# Patient Record
Sex: FEMALE | Race: WHITE | Employment: FULL TIME | URBAN - METROPOLITAN AREA
[De-identification: names, ages, dates, MRNs, and addresses within clinical notes are randomized per-mention and may not be internally consistent; named-entity substitution may affect disease eponyms.]

---

## 2018-06-05 ENCOUNTER — OFFICE VISIT (OUTPATIENT)
Dept: FAMILY MEDICINE CLINIC | Age: 58
End: 2018-06-05
Payer: COMMERCIAL

## 2018-06-05 VITALS
WEIGHT: 117 LBS | OXYGEN SATURATION: 97 % | TEMPERATURE: 97.9 F | HEIGHT: 60 IN | HEART RATE: 68 BPM | SYSTOLIC BLOOD PRESSURE: 112 MMHG | BODY MASS INDEX: 22.97 KG/M2 | DIASTOLIC BLOOD PRESSURE: 68 MMHG | RESPIRATION RATE: 16 BRPM

## 2018-06-05 DIAGNOSIS — G43.001 MIGRAINE WITHOUT AURA AND WITH STATUS MIGRAINOSUS, NOT INTRACTABLE: Primary | ICD-10-CM

## 2018-06-05 DIAGNOSIS — Z12.11 COLON CANCER SCREENING: ICD-10-CM

## 2018-06-05 DIAGNOSIS — Z00.00 HEALTH CARE MAINTENANCE: ICD-10-CM

## 2018-06-05 PROCEDURE — G8427 DOCREV CUR MEDS BY ELIG CLIN: HCPCS | Performed by: FAMILY MEDICINE

## 2018-06-05 PROCEDURE — 1036F TOBACCO NON-USER: CPT | Performed by: FAMILY MEDICINE

## 2018-06-05 PROCEDURE — 99396 PREV VISIT EST AGE 40-64: CPT | Performed by: FAMILY MEDICINE

## 2018-06-05 PROCEDURE — G8420 CALC BMI NORM PARAMETERS: HCPCS | Performed by: FAMILY MEDICINE

## 2018-06-05 PROCEDURE — 3017F COLORECTAL CA SCREEN DOC REV: CPT | Performed by: FAMILY MEDICINE

## 2018-06-05 RX ORDER — DICLOFENAC POTASSIUM 50 MG/1
50 TABLET, FILM COATED ORAL 3 TIMES DAILY PRN
Qty: 90 TABLET | Refills: 11 | Status: SHIPPED | OUTPATIENT
Start: 2018-06-05 | End: 2019-02-06 | Stop reason: DRUGHIGH

## 2018-06-05 RX ORDER — SUMATRIPTAN 100 MG/1
100 TABLET, FILM COATED ORAL PRN
Qty: 9 TABLET | Refills: 11 | Status: SHIPPED | OUTPATIENT
Start: 2018-06-05 | End: 2019-05-31 | Stop reason: SDUPTHER

## 2018-06-05 ASSESSMENT — ENCOUNTER SYMPTOMS
BACK PAIN: 0
SHORTNESS OF BREATH: 0
VOMITING: 0
CONSTIPATION: 1
SINUS PAIN: 1
HEARTBURN: 0
ORTHOPNEA: 0
COUGH: 0
DIARRHEA: 0
SORE THROAT: 0
BLOOD IN STOOL: 0
ABDOMINAL PAIN: 0
WHEEZING: 0
NAUSEA: 1
SPUTUM PRODUCTION: 0
DOUBLE VISION: 0
PHOTOPHOBIA: 1
BLURRED VISION: 0

## 2018-12-24 ENCOUNTER — OFFICE VISIT (OUTPATIENT)
Dept: FAMILY MEDICINE CLINIC | Age: 58
End: 2018-12-24
Payer: COMMERCIAL

## 2018-12-24 VITALS
SYSTOLIC BLOOD PRESSURE: 90 MMHG | RESPIRATION RATE: 16 BRPM | TEMPERATURE: 98.1 F | HEART RATE: 83 BPM | DIASTOLIC BLOOD PRESSURE: 60 MMHG | WEIGHT: 117 LBS | HEIGHT: 60 IN | BODY MASS INDEX: 22.97 KG/M2 | OXYGEN SATURATION: 97 %

## 2018-12-24 DIAGNOSIS — J02.9 ACUTE VIRAL PHARYNGITIS: ICD-10-CM

## 2018-12-24 DIAGNOSIS — J06.9 VIRAL URI: Primary | ICD-10-CM

## 2018-12-24 LAB — S PYO AG THROAT QL: NORMAL

## 2018-12-24 PROCEDURE — 3014F SCREEN MAMMO DOC REV: CPT | Performed by: NURSE PRACTITIONER

## 2018-12-24 PROCEDURE — 87880 STREP A ASSAY W/OPTIC: CPT | Performed by: NURSE PRACTITIONER

## 2018-12-24 PROCEDURE — 1036F TOBACCO NON-USER: CPT | Performed by: NURSE PRACTITIONER

## 2018-12-24 PROCEDURE — 3017F COLORECTAL CA SCREEN DOC REV: CPT | Performed by: NURSE PRACTITIONER

## 2018-12-24 PROCEDURE — 99213 OFFICE O/P EST LOW 20 MIN: CPT | Performed by: NURSE PRACTITIONER

## 2018-12-24 PROCEDURE — G8420 CALC BMI NORM PARAMETERS: HCPCS | Performed by: NURSE PRACTITIONER

## 2018-12-24 PROCEDURE — G8484 FLU IMMUNIZE NO ADMIN: HCPCS | Performed by: NURSE PRACTITIONER

## 2018-12-24 PROCEDURE — G8427 DOCREV CUR MEDS BY ELIG CLIN: HCPCS | Performed by: NURSE PRACTITIONER

## 2018-12-24 RX ORDER — FLUTICASONE PROPIONATE 50 MCG
2 SPRAY, SUSPENSION (ML) NASAL DAILY
Qty: 1 BOTTLE | Refills: 0 | Status: SHIPPED | OUTPATIENT
Start: 2018-12-24 | End: 2019-07-12 | Stop reason: SDUPTHER

## 2018-12-24 RX ORDER — AZITHROMYCIN 250 MG/1
250 TABLET, FILM COATED ORAL SEE ADMIN INSTRUCTIONS
Qty: 6 TABLET | Refills: 0 | Status: SHIPPED | OUTPATIENT
Start: 2018-12-24 | End: 2018-12-29

## 2018-12-24 RX ORDER — ACETAMINOPHEN 500 MG
1000 TABLET ORAL EVERY 6 HOURS PRN
Qty: 30 TABLET | Refills: 0 | Status: SHIPPED | OUTPATIENT
Start: 2018-12-24 | End: 2019-07-12

## 2018-12-24 ASSESSMENT — ENCOUNTER SYMPTOMS
WHEEZING: 0
RHINORRHEA: 1
BACK PAIN: 0
CONSTIPATION: 0
SHORTNESS OF BREATH: 0
COUGH: 0
VOMITING: 0
TROUBLE SWALLOWING: 1
NAUSEA: 0
DIARRHEA: 0
SORE THROAT: 1

## 2018-12-24 NOTE — PROGRESS NOTES
days 2 - 5  Dispense: 6 tablet; Refill: 0    2. Acute viral pharyngitis    - acetaminophen (TYLENOL) 500 MG tablet; Take 2 tablets by mouth every 6 hours as needed for Pain  Dispense: 30 tablet; Refill: 0      Return if symptoms worsen or fail to improve.         Staci Cerda, DU - CNP

## 2019-02-05 ENCOUNTER — TELEPHONE (OUTPATIENT)
Dept: FAMILY MEDICINE CLINIC | Age: 59
End: 2019-02-05

## 2019-02-06 DIAGNOSIS — G43.001 MIGRAINE WITHOUT AURA AND WITH STATUS MIGRAINOSUS, NOT INTRACTABLE: ICD-10-CM

## 2019-02-06 DIAGNOSIS — G43.801 OTHER MIGRAINE WITH STATUS MIGRAINOSUS, NOT INTRACTABLE: ICD-10-CM

## 2019-02-06 DIAGNOSIS — G43.711 CHRONIC MIGRAINE WITHOUT AURA, WITH INTRACTABLE MIGRAINE, SO STATED, WITH STATUS MIGRAINOSUS: Primary | ICD-10-CM

## 2019-05-31 ENCOUNTER — OFFICE VISIT (OUTPATIENT)
Dept: FAMILY MEDICINE CLINIC | Age: 59
End: 2019-05-31
Payer: COMMERCIAL

## 2019-05-31 ENCOUNTER — HOSPITAL ENCOUNTER (OUTPATIENT)
Age: 59
Discharge: HOME | End: 2019-06-02
Payer: COMMERCIAL

## 2019-05-31 VITALS
TEMPERATURE: 97.6 F | OXYGEN SATURATION: 98 % | WEIGHT: 116 LBS | BODY MASS INDEX: 22.78 KG/M2 | HEIGHT: 60 IN | HEART RATE: 76 BPM | DIASTOLIC BLOOD PRESSURE: 62 MMHG | SYSTOLIC BLOOD PRESSURE: 118 MMHG

## 2019-05-31 DIAGNOSIS — Z11.4 ENCOUNTER FOR SCREENING FOR HIV: ICD-10-CM

## 2019-05-31 DIAGNOSIS — Z12.11 COLON CANCER SCREENING: ICD-10-CM

## 2019-05-31 DIAGNOSIS — G43.001 MIGRAINE WITHOUT AURA AND WITH STATUS MIGRAINOSUS, NOT INTRACTABLE: Primary | ICD-10-CM

## 2019-05-31 DIAGNOSIS — Z11.59 NEED FOR HEPATITIS C SCREENING TEST: ICD-10-CM

## 2019-05-31 DIAGNOSIS — Z23 NEED FOR SHINGLES VACCINE: ICD-10-CM

## 2019-05-31 DIAGNOSIS — G43.711 CHRONIC MIGRAINE WITHOUT AURA, WITH INTRACTABLE MIGRAINE, SO STATED, WITH STATUS MIGRAINOSUS: ICD-10-CM

## 2019-05-31 DIAGNOSIS — Z00.00 HEALTH CARE MAINTENANCE: ICD-10-CM

## 2019-05-31 DIAGNOSIS — K59.04 CHRONIC IDIOPATHIC CONSTIPATION: ICD-10-CM

## 2019-05-31 DIAGNOSIS — Z23 NEED FOR TDAP VACCINATION: ICD-10-CM

## 2019-05-31 PROCEDURE — 99214 OFFICE O/P EST MOD 30 MIN: CPT | Performed by: FAMILY MEDICINE

## 2019-05-31 PROCEDURE — 3017F COLORECTAL CA SCREEN DOC REV: CPT | Performed by: FAMILY MEDICINE

## 2019-05-31 PROCEDURE — G8420 CALC BMI NORM PARAMETERS: HCPCS | Performed by: FAMILY MEDICINE

## 2019-05-31 PROCEDURE — 86803 HEPATITIS C AB TEST: CPT

## 2019-05-31 PROCEDURE — 36415 COLL VENOUS BLD VENIPUNCTURE: CPT | Performed by: FAMILY MEDICINE

## 2019-05-31 PROCEDURE — 1036F TOBACCO NON-USER: CPT | Performed by: FAMILY MEDICINE

## 2019-05-31 PROCEDURE — 90715 TDAP VACCINE 7 YRS/> IM: CPT | Performed by: FAMILY MEDICINE

## 2019-05-31 PROCEDURE — G8427 DOCREV CUR MEDS BY ELIG CLIN: HCPCS | Performed by: FAMILY MEDICINE

## 2019-05-31 PROCEDURE — 90471 IMMUNIZATION ADMIN: CPT | Performed by: FAMILY MEDICINE

## 2019-05-31 PROCEDURE — 86703 HIV-1/HIV-2 1 RESULT ANTBDY: CPT

## 2019-05-31 PROCEDURE — 3014F SCREEN MAMMO DOC REV: CPT | Performed by: FAMILY MEDICINE

## 2019-05-31 RX ORDER — DOCUSATE SODIUM 100 MG/1
100 CAPSULE, LIQUID FILLED ORAL DAILY PRN
Qty: 90 CAPSULE | Refills: 3
Start: 2019-05-31 | End: 2019-07-12 | Stop reason: SDUPTHER

## 2019-05-31 RX ORDER — SUMATRIPTAN 100 MG/1
100 TABLET, FILM COATED ORAL PRN
Qty: 9 TABLET | Refills: 11 | Status: SHIPPED | OUTPATIENT
Start: 2019-05-31 | End: 2019-07-12

## 2019-05-31 RX ORDER — POLYETHYLENE GLYCOL 3350 17 G/17G
POWDER, FOR SOLUTION ORAL
Qty: 1530 G | Refills: 1
Start: 2019-05-31

## 2019-05-31 ASSESSMENT — ENCOUNTER SYMPTOMS
BACK PAIN: 0
SINUS PAIN: 1
BLURRED VISION: 0
PHOTOPHOBIA: 1
NAUSEA: 1
BLOOD IN STOOL: 0
SHORTNESS OF BREATH: 0
ORTHOPNEA: 0
VOMITING: 0
SORE THROAT: 0
DOUBLE VISION: 0
DIARRHEA: 0
SPUTUM PRODUCTION: 0
CONSTIPATION: 1
COUGH: 0
WHEEZING: 0
HEARTBURN: 0
ABDOMINAL PAIN: 0

## 2019-05-31 ASSESSMENT — PATIENT HEALTH QUESTIONNAIRE - PHQ9
2. FEELING DOWN, DEPRESSED OR HOPELESS: 0
SUM OF ALL RESPONSES TO PHQ QUESTIONS 1-9: 0
SUM OF ALL RESPONSES TO PHQ9 QUESTIONS 1 & 2: 0
1. LITTLE INTEREST OR PLEASURE IN DOING THINGS: 0
DEPRESSION UNABLE TO ASSESS: FUNCTIONAL CAPACITY MOTIVATION LIMITS ACCURACY
SUM OF ALL RESPONSES TO PHQ QUESTIONS 1-9: 0

## 2019-05-31 NOTE — PROGRESS NOTES
Siena Shirley is a 63 y.o. female who presents today for     Chief Complaint   Patient presents with    Medication Refill     She is treated for migraine headaches    Migraines Headaches: Onset x several years (age 17). Not related to menstrual cycle (patient went through menopause at age 41). Headache pattern include pain behind right eye and right occipital area. Character is throbbing and stabbing. Multiple triggers; she has 3 herniated discs in cervical spine. Typically with insidious onset. Can last up to 3 days Exacerbated by smells, light, loud noise, overuse of neck, stress. Relieved by Treximet (but insurance does not cover adequately). Imitrex will abort headaches. Diclofenac Potassium decreases the frequency and severity of the headaches but not the duration. Previous trials of Topiramate initially effective but then became ineffective. Associated symptoms include nausea and right sided sinus congestion. Because of persistence of symptoms and lack of efficacy with previous treatments, patient inquired about new class of biologics to prevent migraines. After discussion, will try Emgality      Health Maintenance:  Siena Shirley is due for multiple aspects of HM screening and prevention. Due for colon cancer screening; she agrees to FOBT/FIT kit. Also due for Hep C and HIV screening, Tdap and Shingles vaccine. She is agreeable     [address]:  Patient's past medical, surgical, social and/or family history reviewed, updated in chart, and are non-contributory (unless otherwise stated). Medications and allergies also reviewed and updated in chart. Review of Systems  Review of Systems   Constitutional: Positive for malaise/fatigue. Negative for weight loss. HENT: Positive for congestion and sinus pain. Negative for ear pain and sore throat. Eyes: Positive for photophobia. Negative for blurred vision and double vision.    Respiratory: Negative for cough, sputum production, shortness of breath and wheezing. Cardiovascular: Negative for chest pain, palpitations, orthopnea and leg swelling. Gastrointestinal: Positive for constipation (has not used Miralax in some time and changed to OTC Stool softener with suboptimal results) and nausea. Negative for abdominal pain, blood in stool, diarrhea, heartburn and vomiting. Genitourinary: Negative for dysuria, frequency, hematuria and urgency. Musculoskeletal: Negative for back pain, joint pain, myalgias and neck pain. Skin: Negative for itching and rash. Neurological: Negative for dizziness, tingling, focal weakness, weakness and headaches. Psychiatric/Behavioral: Negative for depression, memory loss and substance abuse. The patient has insomnia. The patient is not nervous/anxious. Physical Exam:    VS:  /62   Pulse 76   Temp 97.6 °F (36.4 °C) (Oral)   Ht 5' (1.524 m)   Wt 116 lb (52.6 kg)   SpO2 98%   BMI 22.65 kg/m²   LAST WEIGHT:  Wt Readings from Last 3 Encounters:   05/31/19 116 lb (52.6 kg)   12/24/18 117 lb (53.1 kg)   06/05/18 117 lb (53.1 kg)     Physical Exam   Constitutional: She is oriented to person, place, and time. Vital signs are normal. She appears well-developed and well-nourished. No distress. HENT:   Head: Normocephalic and atraumatic. Right Ear: External ear normal.   Left Ear: External ear normal.   Mouth/Throat: Oropharynx is clear and moist. No oropharyngeal exudate. Eyes: Pupils are equal, round, and reactive to light. Conjunctivae and EOM are normal. Right eye exhibits no discharge. No scleral icterus. Neck: Normal range of motion. Neck supple. No thyromegaly present. Cardiovascular: Normal rate, regular rhythm, normal heart sounds and intact distal pulses. No murmur heard. Pulmonary/Chest: Effort normal. No stridor. No respiratory distress. She has no wheezes. She has no rales. She exhibits no tenderness. Abdominal: Soft.  Bowel sounds are normal. She exhibits no distension and no mass. There is no tenderness. There is no guarding. Musculoskeletal: Normal range of motion. She exhibits no edema or tenderness. Lymphadenopathy:     She has no cervical adenopathy. Neurological: She is alert and oriented to person, place, and time. Skin: Skin is warm and dry. No rash noted. She is not diaphoretic. No erythema. No pallor. Psychiatric: She has a normal mood and affect. Her behavior is normal. Thought content normal.   Vitals reviewed. Labs:  Lab Results   Component Value Date     06/29/2017    K 4.9 06/29/2017    CL 99 06/29/2017    CO2 25 06/29/2017    BUN 19 06/29/2017    CREATININE 0.7 06/29/2017    PROT 7.5 06/29/2017    LABALBU 4.4 06/29/2017    CALCIUM 10.2 06/29/2017    GFRAA >60 06/29/2017    LABGLOM >60 06/29/2017    GLUCOSE 87 06/29/2017    AST 23 06/29/2017    ALT 15 06/29/2017    ALKPHOS 59 06/29/2017    BILITOT 0.5 06/29/2017    TSH 1.780 06/29/2017    CHOL 221 06/29/2017    TRIG 79 06/29/2017    HDL 74 06/29/2017    LDLCALC 131 06/29/2017        Lab Results   Component Value Date    CHOL 221 (H) 06/29/2017     Lab Results   Component Value Date    TRIG 79 06/29/2017     Lab Results   Component Value Date    HDL 74 06/29/2017     Lab Results   Component Value Date    LDLCALC 131 (H) 06/29/2017       No results found for: LABA1C  Lab Results   Component Value Date    LDLCALC 131 (H) 06/29/2017    CREATININE 0.7 06/29/2017         Assessment / Plan:      Cyndi was seen today for medication refill. Diagnoses and all orders for this visit:    Migraine without aura and with status migrainosus, not intractable:  Patient inquired about monthly biologic therapy  -     SUMAtriptan (IMITREX) 100 MG tablet;  Take 1 tablet by mouth as needed for Migraine  -     Galcanezumab-gnlm (EMGALITY) 120 MG/ML SOAJ; Inject 1 mL into the skin every 30 days  -     Prior authorization for Emgality provided    Chronic migraine without aura, with intractable migraine, so stated, with status migrainosus:  Patient inquired about monthly biologic therapy  -     SUMAtriptan (IMITREX) 100 MG tablet; Take 1 tablet by mouth as needed for Migraine  -     Galcanezumab-gnlm (EMGALITY) 120 MG/ML SOAJ; Inject 1 mL into the skin every 30 days  -     Prior authorization for [facility] provided    Health care maintenance:  Agreed to the following  -     POCT Fecal Immunochemical Test (FIT); Future  -     Hepatitis C Antibody; Future  -     HIV Screen; Future  -     Zoster [facility]); Future  -     Tdap (age 6y and older) IM (Boostrix)    Colon cancer screening  -     POCT Fecal Immunochemical Test (FIT); Future    Chronic idiopathic constipation  -     polyethylene glycol (GLYCOLAX) powder; Use 17 g (1 dose) Q MWF, off other days PRN  -     docusate sodium (COLACE) 100 MG capsule; Take 1 capsule by mouth daily as needed for Constipation    Encounter for screening for HIV  -     HIV Screen; Future    Need for hepatitis C screening test  -     Hepatitis C Antibody; Future    Need for Tdap vaccination  -     Tdap (age 6y and older) IM (Boostrix)    Need for shingles vaccine  -     Zoster [facility]); Future       For Prior Authorization of Emgality:   Migraine headaches since age 17. Imitrex will abort headaches. Diclofenac Potassium decreases the frequency and severity of the headaches but not the duration. Topiramate ineffective. Relieved by Treximet (but insurance does not cover adequately). Call or go to ED immediately if symptoms worsen or persist.    Follow Up:  Return 1) F/U 4-6 weeks to assess efficacy of new migraine medication, for 2) F/U 11 months (5/20)  check up, medication refills. or sooner if necessary. Educational materials and/or home exercises printed for patient's review and were included in patient instructions on his/her After Visit Summary and given to patient at the end of visit.       Counseled regarding above diagnosis, including possible risks and complications,  especially if left uncontrolled. Counseled regarding the possible side effects, risks, benefits and alternatives to treatment; patient and/or guardian verbalizes understanding, agrees, feels comfortable with and wishes to proceed with above treatment plan. Advised patient to call with any new medication issues, and read all Rx info from pharmacy to assure aware of all possible risks and side effects of medication before taking. Reviewed age and gender appropriate health screening exams and vaccinations. Advised patient regarding importance of keeping up with recommended health maintenance and to schedule as soon as possible if overdue, as this is important in assessing for undiagnosed pathology, especially cancer, as well as protecting against potentially harmful/life threatening disease. Patient and/or guardian verbalizes understanding and agrees with above counseling, assessment and plan. All questions answered.     Chris Miller MD

## 2019-06-03 LAB
HEPATITIS C ANTIBODY INTERPRETATION: NORMAL
HIV-1 AND HIV-2 ANTIBODIES: NORMAL

## 2019-07-11 ASSESSMENT — ENCOUNTER SYMPTOMS
COUGH: 0
ORTHOPNEA: 0
SINUS PAIN: 1
SORE THROAT: 0
WHEEZING: 0
DIARRHEA: 0
CONSTIPATION: 1
PHOTOPHOBIA: 1
HEARTBURN: 0
ABDOMINAL PAIN: 0
BLOOD IN STOOL: 0
DOUBLE VISION: 0
SPUTUM PRODUCTION: 0
BLURRED VISION: 0
VOMITING: 0
BACK PAIN: 0
NAUSEA: 1
SHORTNESS OF BREATH: 0

## 2019-07-12 ENCOUNTER — OFFICE VISIT (OUTPATIENT)
Dept: FAMILY MEDICINE CLINIC | Age: 59
End: 2019-07-12
Payer: COMMERCIAL

## 2019-07-12 VITALS
WEIGHT: 121.12 LBS | HEART RATE: 79 BPM | HEIGHT: 60 IN | DIASTOLIC BLOOD PRESSURE: 80 MMHG | RESPIRATION RATE: 16 BRPM | BODY MASS INDEX: 23.78 KG/M2 | SYSTOLIC BLOOD PRESSURE: 120 MMHG | OXYGEN SATURATION: 99 % | TEMPERATURE: 98 F

## 2019-07-12 DIAGNOSIS — Z00.00 HEALTH CARE MAINTENANCE: ICD-10-CM

## 2019-07-12 DIAGNOSIS — J06.9 VIRAL URI: ICD-10-CM

## 2019-07-12 DIAGNOSIS — G43.711 CHRONIC MIGRAINE WITHOUT AURA, WITH INTRACTABLE MIGRAINE, SO STATED, WITH STATUS MIGRAINOSUS: Primary | ICD-10-CM

## 2019-07-12 DIAGNOSIS — G43.001 MIGRAINE WITHOUT AURA AND WITH STATUS MIGRAINOSUS, NOT INTRACTABLE: ICD-10-CM

## 2019-07-12 DIAGNOSIS — Z12.11 COLON CANCER SCREENING: ICD-10-CM

## 2019-07-12 DIAGNOSIS — G43.801 OTHER MIGRAINE WITH STATUS MIGRAINOSUS, NOT INTRACTABLE: ICD-10-CM

## 2019-07-12 DIAGNOSIS — K59.04 CHRONIC IDIOPATHIC CONSTIPATION: ICD-10-CM

## 2019-07-12 LAB
CONTROL: NORMAL
HEMOCCULT STL QL: NORMAL

## 2019-07-12 PROCEDURE — G8420 CALC BMI NORM PARAMETERS: HCPCS | Performed by: FAMILY MEDICINE

## 2019-07-12 PROCEDURE — 1036F TOBACCO NON-USER: CPT | Performed by: FAMILY MEDICINE

## 2019-07-12 PROCEDURE — G8427 DOCREV CUR MEDS BY ELIG CLIN: HCPCS | Performed by: FAMILY MEDICINE

## 2019-07-12 PROCEDURE — 82274 ASSAY TEST FOR BLOOD FECAL: CPT | Performed by: FAMILY MEDICINE

## 2019-07-12 PROCEDURE — 3017F COLORECTAL CA SCREEN DOC REV: CPT | Performed by: FAMILY MEDICINE

## 2019-07-12 PROCEDURE — 99213 OFFICE O/P EST LOW 20 MIN: CPT | Performed by: FAMILY MEDICINE

## 2019-07-12 PROCEDURE — 3014F SCREEN MAMMO DOC REV: CPT | Performed by: FAMILY MEDICINE

## 2019-07-12 RX ORDER — SUMATRIPTAN 100 MG/1
100 TABLET, FILM COATED ORAL PRN
Qty: 9 TABLET | Refills: 11
Start: 2019-07-12

## 2019-07-12 RX ORDER — DOCUSATE SODIUM 100 MG/1
100 CAPSULE, LIQUID FILLED ORAL DAILY PRN
Qty: 90 CAPSULE | Refills: 3 | Status: SHIPPED
Start: 2019-07-12 | End: 2022-06-07

## 2019-07-12 RX ORDER — FLUTICASONE PROPIONATE 50 MCG
2 SPRAY, SUSPENSION (ML) NASAL PRN
Qty: 1 BOTTLE | Refills: 11 | Status: SHIPPED | OUTPATIENT
Start: 2019-07-12

## 2019-07-12 NOTE — PATIENT INSTRUCTIONS
or 3 hours. Try a warm shower in place of one session with the heating pad. · You can also try an ice pack for 10 to 15 minutes every 2 to 3 hours. Put a thin cloth between the ice and your skin. · Take pain medicines exactly as directed. ? If the doctor gave you a prescription medicine for pain, take it as prescribed. ? If you are not taking a prescription pain medicine, ask your doctor if you can take an over-the-counter medicine. · If your doctor recommends a cervical collar, wear it exactly as directed. When should you call for help? Call your doctor now or seek immediate medical care if:    · You have new or worsening numbness in your arms, buttocks or legs.     · You have new or worsening weakness in your arms or legs. (This could make it hard to stand up.)     · You lose control of your bladder or bowels.    Watch closely for changes in your health, and be sure to contact your doctor if:    · Your neck pain is getting worse.     · You are not getting better after 1 week.     · You do not get better as expected. Where can you learn more? Go to [email or web address]://[email or web address]-locate.[email or web address]. org and sign in to your [employer] account. Enter [id number] in the [facility] box to learn more about \"Neck Pain: Care Instructions. \"     If you do not have an account, please click on the \"Sign Up Now\" link. Current as of: September 20, 2018  Content Version: 12.0  © 0899-1539 [employer]. Care instructions adapted under license by [facility] ([facility]). If you have questions about a medical condition or this instruction, always ask your healthcare professional. [place] [address] any warranty or liability for your use of this information. Patient Education        Neck: Exercises  Your Care Instructions  Here are some examples of typical rehabilitation exercises for your condition. Start each exercise slowly. Ease off the exercise if you start to have pain.   Your doctor or physical

## 2019-12-17 ENCOUNTER — OFFICE VISIT (OUTPATIENT)
Dept: FAMILY MEDICINE CLINIC | Age: 59
End: 2019-12-17
Payer: COMMERCIAL

## 2019-12-17 VITALS
OXYGEN SATURATION: 97 % | DIASTOLIC BLOOD PRESSURE: 64 MMHG | WEIGHT: 111 LBS | TEMPERATURE: 98.1 F | HEIGHT: 60 IN | BODY MASS INDEX: 21.79 KG/M2 | SYSTOLIC BLOOD PRESSURE: 112 MMHG | HEART RATE: 77 BPM

## 2019-12-17 DIAGNOSIS — J01.00 ACUTE NON-RECURRENT MAXILLARY SINUSITIS: Primary | ICD-10-CM

## 2019-12-17 PROCEDURE — 99213 OFFICE O/P EST LOW 20 MIN: CPT | Performed by: NURSE PRACTITIONER

## 2019-12-17 RX ORDER — DOXYCYCLINE HYCLATE 100 MG/1
100 CAPSULE ORAL 2 TIMES DAILY
Qty: 20 CAPSULE | Refills: 0 | Status: SHIPPED | OUTPATIENT
Start: 2019-12-17 | End: 2019-12-27

## 2020-01-18 ASSESSMENT — ENCOUNTER SYMPTOMS
ORTHOPNEA: 0
BACK PAIN: 0
BLOOD IN STOOL: 0
ABDOMINAL PAIN: 0
HEARTBURN: 0
NAUSEA: 1
SORE THROAT: 0
PHOTOPHOBIA: 1
SHORTNESS OF BREATH: 0
COUGH: 0
DIARRHEA: 0
SINUS PAIN: 1
SPUTUM PRODUCTION: 0
VOMITING: 0
CONSTIPATION: 1
BLURRED VISION: 0
WHEEZING: 0
DOUBLE VISION: 0

## 2020-01-18 NOTE — PROGRESS NOTES
Bakari Benson is a 63 y.o. female who presents today for     Chief Complaint   Patient presents with    Migraine     no headaches since she began the new medication     She is treated for migraine headaches    Migraines Headaches: Onset x several years (age 17). Not related to menstrual cycle (patient went through menopause at age 41). Headache pattern include pain behind right eye and right occipital area. Character is throbbing and stabbing. Multiple triggers; she has 3 herniated discs in cervical spine. Typically with insidious onset. Can last up to 3 days Exacerbated by smells, light, loud noise, overuse of neck, stress. Relieved by Treximet (but insurance does not cover adequately). Imitrex will abort headaches. Diclofenac Potassium decreases the frequency and severity of the headaches but not the duration. Previous trials of Topiramate initially effective but then became ineffective. Associated symptoms include nausea and right sided sinus congestion. Patient was provided samples of Emgality, which she did loading dose and has been migraine free since. After a great deal of effort, Emgality was finally approved by insurance and patient will  prescription today. She has not been migraine symptoms free in over 40 years. .. She tapered off diclofenac completely. Only migraine medication at this time remains Emgality. There are concerns that insurance will not cover it due to the fact that patient is not under the care of a \"headache specialist\". Patient inquired about taking a \"biologics\" holiday x 1 month to see if symptoms recur off Emgality. After discussion about pros and cons of D/C medication x 1 months, shared decision making concluded that a trial off Emgality is a reasonable plan for 1 month. Will follow closely.   She will also call insurance company to determine if coverage for the other available biologics is any better or different      [address]:  Patient's past medical, surgical, social and/or family history reviewed, updated in chart, and are non-contributory (unless otherwise stated). Medications and allergies also reviewed and updated in chart. Review of Systems  Review of Systems   Constitutional: Positive for malaise/fatigue. Negative for weight loss. HENT: Positive for congestion and sinus pain. Negative for ear pain and sore throat. Eyes: Positive for photophobia. Negative for blurred vision and double vision. Respiratory: Negative for cough, sputum production, shortness of breath and wheezing. Cardiovascular: Negative for chest pain, palpitations, orthopnea and leg swelling. Gastrointestinal: Positive for constipation (has not used Miralax in some time and changed to OTC Stool softener with suboptimal results) and nausea. Negative for abdominal pain, blood in stool, diarrhea, heartburn and vomiting. Genitourinary: Negative for dysuria, frequency, hematuria and urgency. Musculoskeletal: Negative for back pain, joint pain, myalgias and neck pain. Skin: Negative for itching and rash. Neurological: Negative for dizziness, tingling, focal weakness, weakness and headaches. Endo/Heme/Allergies: Positive for environmental allergies. Psychiatric/Behavioral: Negative for depression, memory loss and substance abuse. The patient has insomnia. The patient is not nervous/anxious. Physical Exam:    VS:  /60 (Site: Right Upper Arm, Position: Sitting, Cuff Size: Medium Adult)   Pulse 77   Temp 98.2 °F (36.8 °C) (Oral)   Resp 16   Ht 5' (1.524 m)   Wt 112 lb 8 oz (51 kg)   SpO2 98%   Breastfeeding No   BMI 21.97 kg/m²   LAST WEIGHT:  Wt Readings from Last 3 Encounters:   01/20/20 112 lb 8 oz (51 kg)   12/17/19 111 lb (50.3 kg)   07/12/19 121 lb 1.9 oz (54.9 kg)     Physical Exam   Constitutional: She is oriented to person, place, and time. Vital signs are normal. She appears well-developed and well-nourished. No distress. Value Date    LDLCALC 131 (H) 06/29/2017       No results found for: LABA1C  Lab Results   Component Value Date    LDLCALC 131 (H) 06/29/2017    CREATININE 0.7 06/29/2017         Assessment / Plan:      Cyndi was seen today for migraine. Diagnoses and all orders for this visit:    Migraine without aura and with status migrainosus, not intractable      Chronic migraine without aura, with intractable migraine, so stated, with status migrainosus: Symptoms seem to have resolved with initiation of Emgality but running into insuracne coverage issues        -     After discussion and shared decision making, will place next dose of Emgality on hold and observe response    Migraine without aura and with status migrainosus, not intractable: Symptoms seem to have resolved with initiation of Emgality but running into insuracne coverage issues        -     After discussion and shared decision making, will place next dose of Emgality on hold and observe response      Other migraine with status migrainosus, not intractable: Symptoms seem to have resolved with initiation of Emgality but running into insuracne coverage issues        -     After discussion and shared decision making, will place next dose of Emgality on hold and observe response     For Prior Authorization of Emgality:   Migraine headaches since age 17. Imitrex will abort headaches. Diclofenac Potassium decreases the frequency and severity of the headaches but not the duration. Topiramate ineffective. Relieved by Treximet (but insurance does not cover adequately). Call or go to ED immediately if symptoms worsen or persist.    Follow Up:  Return F/U 4-6 weeks (2nd week of 3/2020), for To assess response of discontinuing medication on symptoms. or sooner if necessary.       Educational materials and/or home exercises printed for patient's review and were included in patient instructions on his/her After Visit Summary and given to patient at the end of visit. Counseled regarding above diagnosis, including possible risks and complications,  especially if left uncontrolled. Counseled regarding the possible side effects, risks, benefits and alternatives to treatment; patient and/or guardian verbalizes understanding, agrees, feels comfortable with and wishes to proceed with above treatment plan. Advised patient to call with any new medication issues, and read all Rx info from pharmacy to assure aware of all possible risks and side effects of medication before taking. Reviewed age and gender appropriate health screening exams and vaccinations. Advised patient regarding importance of keeping up with recommended health maintenance and to schedule as soon as possible if overdue, as this is important in assessing for undiagnosed pathology, especially cancer, as well as protecting against potentially harmful/life threatening disease. Patient and/or guardian verbalizes understanding and agrees with above counseling, assessment and plan. All questions answered.     Holly Nichols MD

## 2020-01-20 ENCOUNTER — OFFICE VISIT (OUTPATIENT)
Dept: FAMILY MEDICINE CLINIC | Age: 60
End: 2020-01-20
Payer: COMMERCIAL

## 2020-01-20 VITALS
HEART RATE: 77 BPM | DIASTOLIC BLOOD PRESSURE: 60 MMHG | HEIGHT: 60 IN | SYSTOLIC BLOOD PRESSURE: 100 MMHG | WEIGHT: 112.5 LBS | BODY MASS INDEX: 22.09 KG/M2 | OXYGEN SATURATION: 98 % | TEMPERATURE: 98.2 F | RESPIRATION RATE: 16 BRPM

## 2020-01-20 PROCEDURE — 1036F TOBACCO NON-USER: CPT | Performed by: FAMILY MEDICINE

## 2020-01-20 PROCEDURE — G8420 CALC BMI NORM PARAMETERS: HCPCS | Performed by: FAMILY MEDICINE

## 2020-01-20 PROCEDURE — G8427 DOCREV CUR MEDS BY ELIG CLIN: HCPCS | Performed by: FAMILY MEDICINE

## 2020-01-20 PROCEDURE — 99213 OFFICE O/P EST LOW 20 MIN: CPT | Performed by: FAMILY MEDICINE

## 2020-01-20 PROCEDURE — 3017F COLORECTAL CA SCREEN DOC REV: CPT | Performed by: FAMILY MEDICINE

## 2020-01-20 PROCEDURE — G8484 FLU IMMUNIZE NO ADMIN: HCPCS | Performed by: FAMILY MEDICINE

## 2020-01-20 RX ORDER — BACITRACIN 500 [USP'U]/G
OINTMENT OPHTHALMIC
COMMUNITY
Start: 2020-01-06 | End: 2022-06-07

## 2020-01-20 ASSESSMENT — PATIENT HEALTH QUESTIONNAIRE - PHQ9
1. LITTLE INTEREST OR PLEASURE IN DOING THINGS: 0
SUM OF ALL RESPONSES TO PHQ QUESTIONS 1-9: 0
SUM OF ALL RESPONSES TO PHQ QUESTIONS 1-9: 0
2. FEELING DOWN, DEPRESSED OR HOPELESS: 0
SUM OF ALL RESPONSES TO PHQ9 QUESTIONS 1 & 2: 0

## 2021-01-27 ASSESSMENT — ENCOUNTER SYMPTOMS
COUGH: 0
NAUSEA: 1
WHEEZING: 0
ORTHOPNEA: 0
BLOOD IN STOOL: 0
BLURRED VISION: 0
HEARTBURN: 0
SPUTUM PRODUCTION: 0
SHORTNESS OF BREATH: 0
BACK PAIN: 0
DIARRHEA: 0
SINUS PAIN: 1
SORE THROAT: 0
ABDOMINAL PAIN: 0
CONSTIPATION: 1
PHOTOPHOBIA: 1
VOMITING: 0
DOUBLE VISION: 0

## 2021-01-27 NOTE — PROGRESS NOTES
Jose Landau is a 62 y.o. female who presents today for     Chief Complaint   Patient presents with    Migraine     x 3 months, increasing over time     She is treated for migraine headaches    Migraines Headaches: Onset x several years (age 17). Not related to menstrual cycle (patient went through menopause at age 41). Headache pattern include pain behind right eye and right occipital area. Character is throbbing and stabbing. Multiple triggers; she has 3 herniated discs in cervical spine. Typically with insidious onset. Can last up to 3 days Exacerbated by smells, light, loud noise, overuse of neck, stress. Relieved by Treximet (but insurance does not cover adequately). Imitrex will abort headaches. Diclofenac Potassium decreases the frequency and severity of the headaches but not the duration. Previous trials of Topiramate initially effective but then became ineffective. Associated symptoms include nausea and right sided sinus congestion. Initiation of Emgality in 2019 produced resolution of her headaches that had been present for > 40 years. She returns today with recurrence of headaches. Further history: she had to space out the usage of her Emgality because of cost.  As a result, she is starting to become increasingly symptomatic from a frequency and severity standpoint        [address]:  Patient's past medical, surgical, social and/or family history reviewed, updated in chart, and are non-contributory (unless otherwise stated). Medications and allergies also reviewed and updated in chart. Review of Systems  Review of Systems   Constitutional: Positive for malaise/fatigue. Negative for weight loss. HENT: Positive for congestion and sinus pain. Negative for ear pain and sore throat. Eyes: Positive for photophobia. Negative for blurred vision and double vision. Respiratory: Negative for cough, sputum production, shortness of breath and wheezing.     Cardiovascular: Negative for chest pain, palpitations, orthopnea and leg swelling. Gastrointestinal: Positive for constipation (has not used Miralax in some time and changed to OTC Stool softener with suboptimal results) and nausea. Negative for abdominal pain, blood in stool, diarrhea, heartburn and vomiting. Genitourinary: Negative for dysuria, frequency, hematuria and urgency. Musculoskeletal: Negative for back pain, joint pain, myalgias and neck pain. Skin: Negative for itching and rash. Neurological: Positive for headaches. Negative for dizziness, tingling, focal weakness and weakness. Endo/Heme/Allergies: Positive for environmental allergies. Psychiatric/Behavioral: Negative for depression, memory loss and substance abuse. The patient has insomnia. The patient is not nervous/anxious. Physical Exam:    VS:  /60   Pulse 73   Temp 97.5 °F (36.4 °C) (Infrared)   Resp 18   Ht 5' (1.524 m)   Wt 116 lb 8 oz (52.8 kg)   SpO2 98%   BMI 22.75 kg/m²   LAST WEIGHT:  Wt Readings from Last 3 Encounters:   02/10/21 116 lb 8 oz (52.8 kg)   01/20/20 112 lb 8 oz (51 kg)   12/17/19 111 lb (50.3 kg)     Physical Exam   Constitutional: She is oriented to person, place, and time. Vital signs are normal. She appears well-developed and well-nourished. No distress. HENT:   Head: Normocephalic and atraumatic. Right Ear: External ear normal.   Left Ear: External ear normal.   Mouth/Throat: Oropharynx is clear and moist. No oropharyngeal exudate. Eyes: Pupils are equal, round, and reactive to light. Conjunctivae and EOM are normal. Right eye exhibits no discharge. No scleral icterus. Neck: Normal range of motion. Neck supple. No thyromegaly present. Cardiovascular: Normal rate, regular rhythm, normal heart sounds and intact distal pulses. No murmur heard. Pulmonary/Chest: Effort normal. No stridor. No respiratory distress. She has no wheezes. She has no rales. She exhibits no tenderness. Abdominal: Soft. Bowel sounds are normal. She exhibits no distension and no mass. There is no abdominal tenderness. There is no guarding. Musculoskeletal: Normal range of motion. General: No tenderness or edema. Lymphadenopathy:     She has no cervical adenopathy. Neurological: She is alert and oriented to person, place, and time. Skin: Skin is warm and dry. No rash noted. She is not diaphoretic. No erythema. No pallor. Psychiatric: She has a normal mood and affect. Her behavior is normal. Thought content normal.   Vitals reviewed. Labs:  Lab Results   Component Value Date     06/29/2017    K 4.9 06/29/2017    CL 99 06/29/2017    CO2 25 06/29/2017    BUN 19 06/29/2017    CREATININE 0.7 06/29/2017    PROT 7.5 06/29/2017    LABALBU 4.4 06/29/2017    CALCIUM 10.2 06/29/2017    GFRAA >60 06/29/2017    LABGLOM >60 06/29/2017    GLUCOSE 87 06/29/2017    AST 23 06/29/2017    ALT 15 06/29/2017    ALKPHOS 59 06/29/2017    BILITOT 0.5 06/29/2017    TSH 1.780 06/29/2017    CHOL 221 06/29/2017    TRIG 79 06/29/2017    HDL 74 06/29/2017    LDLCALC 131 06/29/2017        Lab Results   Component Value Date    CHOL 221 (H) 06/29/2017     Lab Results   Component Value Date    TRIG 79 06/29/2017     Lab Results   Component Value Date    HDL 74 06/29/2017     Lab Results   Component Value Date    LDLCALC 131 (H) 06/29/2017       No results found for: LABA1C  Lab Results   Component Value Date    LDLCALC 131 (H) 06/29/2017    CREATININE 0.7 06/29/2017         Assessment / Plan:      Cyndi was seen today for migraine. Diagnoses and all orders for this visit:    Migraine without aura and with status migrainosus, not intractable: Increasing frequency and severity with inconsistent use Emgality  -     Galcanezumab-gnlm (EMGALITY) 120 MG/ML SOAJ; Inject 1 mL into the skin every 30 days  -     Prior authorization to be attempted.  Patient will reach out to  as well as attempt use of               discount coupons to afford medications    Chronic migraine without aura, with intractable migraine, so stated, with status migrainosus: Increasing frequency and severity with inconsistent use Emgality  -     Galcanezumab-gnlm (EMGALITY) 120 MG/ML SOAJ; Inject 1 mL into the skin every 30 days  -     Prior authorization to be attempted. Patient will reach out to  as well as attempt use of               discount coupons to afford medications      Colon cancer screening: Agreed to [facility]; Future    Flu vaccine need  -     INFLUENZA, QUADV, 3 YRS AND OLDER, IM PF, PREFILL SYR OR SDV, 0.5ML (AFLURIA QUADV, PF)       For Prior Authorization of Emgality:   Migraine headaches since age 17. Imitrex will abort headaches. Diclofenac Potassium decreases the frequency and severity of the headaches but not the duration. Topiramate ineffective. Relieved by Treximet (but insurance does not cover adequately). Call or go to ED immediately if symptoms worsen or persist.    Follow Up:  Return for F/U 6 months for check up. or sooner if necessary. Educational materials and/or home exercises printed for patient's review and were included in patient instructions on his/her After Visit Summary and given to patient at the end of visit. Counseled regarding above diagnosis, including possible risks and complications,  especially if left uncontrolled. Counseled regarding the possible side effects, risks, benefits and alternatives to treatment; patient and/or guardian verbalizes understanding, agrees, feels comfortable with and wishes to proceed with above treatment plan. Advised patient to call with any new medication issues, and read all Rx info from pharmacy to assure aware of all possible risks and side effects of medication before taking. Reviewed age and gender appropriate health screening exams and vaccinations.   Advised patient regarding importance of keeping up with recommended health maintenance and to schedule as soon as possible if overdue, as this is important in assessing for undiagnosed pathology, especially cancer, as well as protecting against potentially harmful/life threatening disease. Patient and/or guardian verbalizes understanding and agrees with above counseling, assessment and plan. All questions answered.     Janna Barksdale MD

## 2021-02-10 ENCOUNTER — OFFICE VISIT (OUTPATIENT)
Dept: FAMILY MEDICINE CLINIC | Age: 61
End: 2021-02-10
Payer: COMMERCIAL

## 2021-02-10 VITALS
HEART RATE: 73 BPM | BODY MASS INDEX: 22.87 KG/M2 | DIASTOLIC BLOOD PRESSURE: 60 MMHG | TEMPERATURE: 97.5 F | WEIGHT: 116.5 LBS | HEIGHT: 60 IN | SYSTOLIC BLOOD PRESSURE: 108 MMHG | OXYGEN SATURATION: 98 % | RESPIRATION RATE: 18 BRPM

## 2021-02-10 DIAGNOSIS — Z23 FLU VACCINE NEED: ICD-10-CM

## 2021-02-10 DIAGNOSIS — G43.711 CHRONIC MIGRAINE WITHOUT AURA, WITH INTRACTABLE MIGRAINE, SO STATED, WITH STATUS MIGRAINOSUS: ICD-10-CM

## 2021-02-10 DIAGNOSIS — G43.001 MIGRAINE WITHOUT AURA AND WITH STATUS MIGRAINOSUS, NOT INTRACTABLE: Primary | ICD-10-CM

## 2021-02-10 DIAGNOSIS — Z12.11 COLON CANCER SCREENING: ICD-10-CM

## 2021-02-10 PROCEDURE — 3017F COLORECTAL CA SCREEN DOC REV: CPT | Performed by: FAMILY MEDICINE

## 2021-02-10 PROCEDURE — G8482 FLU IMMUNIZE ORDER/ADMIN: HCPCS | Performed by: FAMILY MEDICINE

## 2021-02-10 PROCEDURE — G8427 DOCREV CUR MEDS BY ELIG CLIN: HCPCS | Performed by: FAMILY MEDICINE

## 2021-02-10 PROCEDURE — 90686 IIV4 VACC NO PRSV 0.5 ML IM: CPT | Performed by: FAMILY MEDICINE

## 2021-02-10 PROCEDURE — 1036F TOBACCO NON-USER: CPT | Performed by: FAMILY MEDICINE

## 2021-02-10 PROCEDURE — 99213 OFFICE O/P EST LOW 20 MIN: CPT | Performed by: FAMILY MEDICINE

## 2021-02-10 PROCEDURE — 90471 IMMUNIZATION ADMIN: CPT | Performed by: FAMILY MEDICINE

## 2021-02-10 PROCEDURE — G8420 CALC BMI NORM PARAMETERS: HCPCS | Performed by: FAMILY MEDICINE

## 2021-02-10 RX ORDER — GALCANEZUMAB 120 MG/ML
1 INJECTION, SOLUTION SUBCUTANEOUS
Qty: 1 ML | Refills: 11 | Status: SHIPPED | OUTPATIENT
Start: 2021-02-10

## 2021-02-10 SDOH — ECONOMIC STABILITY: FOOD INSECURITY: WITHIN THE PAST 12 MONTHS, THE FOOD YOU BOUGHT JUST DIDN'T LAST AND YOU DIDN'T HAVE MONEY TO GET MORE.: NEVER TRUE

## 2021-02-10 SDOH — ECONOMIC STABILITY: TRANSPORTATION INSECURITY
IN THE PAST 12 MONTHS, HAS LACK OF TRANSPORTATION KEPT YOU FROM MEETINGS, WORK, OR FROM GETTING THINGS NEEDED FOR DAILY LIVING?: NOT ASKED

## 2021-02-10 SDOH — ECONOMIC STABILITY: FOOD INSECURITY: WITHIN THE PAST 12 MONTHS, YOU WORRIED THAT YOUR FOOD WOULD RUN OUT BEFORE YOU GOT MONEY TO BUY MORE.: NEVER TRUE

## 2021-02-10 ASSESSMENT — PATIENT HEALTH QUESTIONNAIRE - PHQ9
SUM OF ALL RESPONSES TO PHQ QUESTIONS 1-9: 0
SUM OF ALL RESPONSES TO PHQ QUESTIONS 1-9: 0

## 2021-02-10 NOTE — PROGRESS NOTES
Vaccine Information Sheet, \"Influenza - Inactivated\"  given to [facility] [employer], or parent/legal guardian of  [facility] [employer] and verbalized understanding. Patient responses:    Have you ever had a reaction to a flu vaccine? No  Do you have any current illness? No  Have you ever had Guillian Molena Syndrome? No  Do you have a serious allergy to any of the follow: Neomycin, Polymyxin, Thimerosal, eggs or egg products? No    Flu vaccine given per order. Please see immunization tab. Risks and benefits explained. Current VIS given.       Immunizations Administered     Name Date Dose Route    Influenza, Quadv, IM, PF (6 mo and older Fluzone, Flulaval, Fluarix, and 3 yrs and older Afluria) 2/10/2021 0.5 mL Intramuscular    Site: Deltoid- Left    Lot: [id number]    NDC: 04401-849-77

## 2021-03-04 DIAGNOSIS — Z12.11 COLON CANCER SCREENING: ICD-10-CM

## 2021-12-20 LAB — MAMMOGRAPHY, EXTERNAL: NORMAL

## 2022-01-09 LAB
Lab: NORMAL
SARS-COV-2, IGG: NORMAL

## 2022-06-07 ENCOUNTER — OFFICE VISIT (OUTPATIENT)
Dept: FAMILY MEDICINE CLINIC | Age: 62
End: 2022-06-07
Payer: COMMERCIAL

## 2022-06-07 VITALS
BODY MASS INDEX: 22.78 KG/M2 | SYSTOLIC BLOOD PRESSURE: 90 MMHG | OXYGEN SATURATION: 97 % | HEIGHT: 60 IN | DIASTOLIC BLOOD PRESSURE: 60 MMHG | HEART RATE: 70 BPM | RESPIRATION RATE: 16 BRPM | WEIGHT: 116 LBS | TEMPERATURE: 98.7 F

## 2022-06-07 DIAGNOSIS — F51.5 NIGHTMARE DISORDER: ICD-10-CM

## 2022-06-07 DIAGNOSIS — G47.69 SLEEP-RELATED MOVEMENT DISORDER: ICD-10-CM

## 2022-06-07 DIAGNOSIS — G47.69 SLEEP-RELATED MOVEMENT DISORDER: Primary | ICD-10-CM

## 2022-06-07 DIAGNOSIS — Z83.438 FH: HYPERLIPIDEMIA: ICD-10-CM

## 2022-06-07 LAB
BASOPHILS ABSOLUTE: 0.08 E9/L (ref 0–0.2)
BASOPHILS RELATIVE PERCENT: 0.8 % (ref 0–2)
EOSINOPHILS ABSOLUTE: 0.29 E9/L (ref 0.05–0.5)
EOSINOPHILS RELATIVE PERCENT: 2.9 % (ref 0–6)
HCT VFR BLD CALC: 45 % (ref 34–48)
HEMOGLOBIN: 14.6 G/DL (ref 11.5–15.5)
IMMATURE GRANULOCYTES #: 0.04 E9/L
IMMATURE GRANULOCYTES %: 0.4 % (ref 0–5)
LYMPHOCYTES ABSOLUTE: 2.14 E9/L (ref 1.5–4)
LYMPHOCYTES RELATIVE PERCENT: 21.4 % (ref 20–42)
MCH RBC QN AUTO: 30.7 PG (ref 26–35)
MCHC RBC AUTO-ENTMCNC: 32.4 % (ref 32–34.5)
MCV RBC AUTO: 94.7 FL (ref 80–99.9)
MONOCYTES ABSOLUTE: 0.68 E9/L (ref 0.1–0.95)
MONOCYTES RELATIVE PERCENT: 6.8 % (ref 2–12)
NEUTROPHILS ABSOLUTE: 6.75 E9/L (ref 1.8–7.3)
NEUTROPHILS RELATIVE PERCENT: 67.7 % (ref 43–80)
PDW BLD-RTO: 12.4 FL (ref 11.5–15)
PLATELET # BLD: 258 E9/L (ref 130–450)
PMV BLD AUTO: 10.1 FL (ref 7–12)
RBC # BLD: 4.75 E12/L (ref 3.5–5.5)
WBC # BLD: 10 E9/L (ref 4.5–11.5)

## 2022-06-07 PROCEDURE — 99214 OFFICE O/P EST MOD 30 MIN: CPT | Performed by: FAMILY MEDICINE

## 2022-06-07 RX ORDER — HYDROXYZINE HYDROCHLORIDE 10 MG/1
10 TABLET, FILM COATED ORAL NIGHTLY
Qty: 90 TABLET | Refills: 1 | Status: SHIPPED | OUTPATIENT
Start: 2022-06-07 | End: 2022-12-17

## 2022-06-07 RX ORDER — HYDROXYZINE HYDROCHLORIDE 10 MG/1
10 TABLET, FILM COATED ORAL NIGHTLY
Qty: 10 TABLET | Refills: 0 | Status: SHIPPED
Start: 2022-06-07 | End: 2022-06-07

## 2022-06-07 RX ORDER — SODIUM FLUORIDE 5 MG/G
CREAM DENTAL
COMMUNITY
Start: 2022-05-02

## 2022-06-07 SDOH — ECONOMIC STABILITY: FOOD INSECURITY: WITHIN THE PAST 12 MONTHS, THE FOOD YOU BOUGHT JUST DIDN'T LAST AND YOU DIDN'T HAVE MONEY TO GET MORE.: NEVER TRUE

## 2022-06-07 SDOH — ECONOMIC STABILITY: FOOD INSECURITY: WITHIN THE PAST 12 MONTHS, YOU WORRIED THAT YOUR FOOD WOULD RUN OUT BEFORE YOU GOT MONEY TO BUY MORE.: NEVER TRUE

## 2022-06-07 ASSESSMENT — PATIENT HEALTH QUESTIONNAIRE - PHQ9
2. FEELING DOWN, DEPRESSED OR HOPELESS: 0
SUM OF ALL RESPONSES TO PHQ QUESTIONS 1-9: 0
SUM OF ALL RESPONSES TO PHQ9 QUESTIONS 1 & 2: 0
1. LITTLE INTEREST OR PLEASURE IN DOING THINGS: 0
SUM OF ALL RESPONSES TO PHQ QUESTIONS 1-9: 0

## 2022-06-07 ASSESSMENT — ENCOUNTER SYMPTOMS
ABDOMINAL PAIN: 0
VOMITING: 0
SORE THROAT: 0
CONSTIPATION: 0
NAUSEA: 0
DIARRHEA: 0
BACK PAIN: 0
SHORTNESS OF BREATH: 0
COUGH: 0
BLOOD IN STOOL: 0
WHEEZING: 0

## 2022-06-07 ASSESSMENT — LIFESTYLE VARIABLES
HOW MANY STANDARD DRINKS CONTAINING ALCOHOL DO YOU HAVE ON A TYPICAL DAY: 1 OR 2
HOW OFTEN DO YOU HAVE A DRINK CONTAINING ALCOHOL: MONTHLY OR LESS

## 2022-06-07 ASSESSMENT — SOCIAL DETERMINANTS OF HEALTH (SDOH): HOW HARD IS IT FOR YOU TO PAY FOR THE VERY BASICS LIKE FOOD, HOUSING, MEDICAL CARE, AND HEATING?: NOT VERY HARD

## 2022-06-07 NOTE — PROGRESS NOTES
Kath Fuller is a 65 y.o. female who presents today for     Chief Complaint   Patient presents with    Insomnia     patient having nightmares, getting worse, somebody chasing patient or after patient. sometimes does not wake up, jumped out of bed, got injured on treadmill and did not wake up until  picked her up and put back in bed , started around 2 years ago and getting worse as time goes on. Nightmares:  Patient having nightmares. Onset x 2 years ago, with stressors of family illness, COVID, loss of job occurring. These are getting worse; she describes nightmares as somebody chasing patient. Despite fearful nature of nightmares, she sometimes will not wake up. However, she has jumped out of bed and acted out. One episode even led to injury on treadmill when she fell out of bed and did not wake up until  picked her up and put back in bed. STOP-BAN/8 (low risk). [place] Sleepiness Scale:  (moderate risk of MARIA EUGENIA)      [facility]:  Patient's past medical, surgical, social and/or family history reviewed, updated in chart, and are non-contributory (unless otherwise stated). Medications and allergies also reviewed and updated in chart. Review of Systems  Review of Systems   HENT: Negative for congestion, ear pain and sore throat. Respiratory: Negative for cough, shortness of breath and wheezing. Cardiovascular: Negative for chest pain, palpitations and leg swelling. Gastrointestinal: Negative for abdominal pain, blood in stool, constipation, diarrhea, nausea and vomiting. Genitourinary: Negative for dysuria, frequency, hematuria and urgency. Musculoskeletal: Negative for back pain, myalgias and neck pain. Skin: Negative for rash. Neurological: Positive for headaches (life long history migraine headaches). Negative for dizziness and weakness. Psychiatric/Behavioral: Positive for sleep disturbance. The patient is not nervous/anxious.         Physical Exam:    VS:  BP 90/60   Pulse 70   Temp 98.7 °F (37.1 °C) (Infrared)   Resp 16   Ht 5' (1.524 m)   Wt 116 lb (52.6 kg)   SpO2 97%   BMI 22.65 kg/m²     LAST WEIGHT:  Wt Readings from Last 3 Encounters:   06/07/22 116 lb (52.6 kg)   02/10/21 116 lb 8 oz (52.8 kg)   01/20/20 112 lb 8 oz (51 kg)       BMI Readings from Last 3 Encounters:   06/07/22 22.65 kg/m²   02/10/21 22.75 kg/m²   01/20/20 21.97 kg/m²       Physical Exam  Constitutional:       General: She is not in acute distress. Appearance: She is well-developed. She is not diaphoretic. HENT:      Head: Normocephalic and atraumatic. Right Ear: External ear normal.      Left Ear: External ear normal.      Mouth/Throat:      Pharynx: No oropharyngeal exudate. Eyes:      General: No scleral icterus. Right eye: No discharge. Conjunctiva/sclera: Conjunctivae normal.      Pupils: Pupils are equal, round, and reactive to light. Neck:      Thyroid: No thyromegaly. Cardiovascular:      Rate and Rhythm: Normal rate and regular rhythm. Heart sounds: Normal heart sounds. No murmur heard. Pulmonary:      Effort: Pulmonary effort is normal. No respiratory distress. Breath sounds: No stridor. No wheezing or rales. Chest:      Chest wall: No tenderness. Abdominal:      General: Bowel sounds are normal. There is no distension. Palpations: Abdomen is soft. There is no mass. Tenderness: There is no abdominal tenderness. There is no guarding. Musculoskeletal:         General: No tenderness. Normal range of motion. Cervical back: Normal range of motion and neck supple. Lymphadenopathy:      Cervical: No cervical adenopathy. Skin:     General: Skin is warm and dry. Coloration: Skin is not pale. Findings: No erythema or rash. Neurological:      Mental Status: She is alert and oriented to person, place, and time. Psychiatric:         Behavior: Behavior normal.         Thought Content:  Thought content normal.         Labs:  Lab Results   Component Value Date     06/29/2017    K 4.9 06/29/2017    CL 99 06/29/2017    CO2 25 06/29/2017    BUN 19 06/29/2017    CREATININE 0.7 06/29/2017    PROT 7.5 06/29/2017    LABALBU 4.4 06/29/2017    CALCIUM 10.2 06/29/2017    GFRAA >60 06/29/2017    LABGLOM >60 06/29/2017    GLUCOSE 87 06/29/2017    AST 23 06/29/2017    ALT 15 06/29/2017    ALKPHOS 59 06/29/2017    BILITOT 0.5 06/29/2017    TSH 1.780 06/29/2017    CHOL 221 06/29/2017    TRIG 79 06/29/2017    HDL 74 06/29/2017    LDLCALC 131 06/29/2017        Lab Results   Component Value Date    CHOL 221 (H) 06/29/2017     Lab Results   Component Value Date    TRIG 79 06/29/2017     Lab Results   Component Value Date    HDL 74 06/29/2017     Lab Results   Component Value Date    LDLCALC 131 (H) 06/29/2017       No results found for: LABA1C  Lab Results   Component Value Date    LDLCALC 131 (H) 06/29/2017    CREATININE 0.7 06/29/2017           Assessment / Plan:      Cyndi was seen today for insomnia. 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  -     CBC with Auto Differential; Future  -     Comprehensive Metabolic Panel; Future  -     TSH; Future  -     Ferritin; Future  -     [facility] Sleep Medicine  -     Baseline Diagnostic Sleep Study;  Future  -     Discontinue: hydrOXYzine HCl (ATARAX) 10 MG tablet; Take 1 tablet by mouth nightly for 10 days  -     hydrOXYzine HCl (ATARAX) 10 MG tablet; Take 1 tablet by mouth nightly           Time spent in pre-visit planning, interview, exam, /education and coordination of care: 33 minutes    Follow Up:  Return F/U 6-8 weeks, for to assess response of symptoms to diagnosis and treatment (sleep). or sooner if necessary. Call or go to ED immediately if symptoms worsen or persist.    Educational materials and/or home exercises printed for patient's review and were included in patient instructions on his/her AfterVisit Summary and given to patient at the end of visit. Counseled regarding above diagnosis,including possible risks and complications,  especially if left uncontrolled. Counseled regarding the possible side effects, risks, benefits and alternatives to treatment; patient and/or guardian verbalizes understanding, agrees, feels comfortable with and wishes to proceed with above treatment plan. Advised patient tocall with any new medication issues, and read all Rx info from pharmacy to assureaware of all possible risks and side effects of medication before taking. Reviewed age and gender appropriate health screening exams and vaccinations. Advisedpatient regarding importance of keeping up with recommended health maintenance andto schedule as soon as possible if overdue, as this is important in assessing forundiagnosed pathology, especially cancer, as well as protecting against potentially harmful/life threatening disease. Patient and/or guardian verbalizes understandingand agrees with above counseling, assessment and plan. All questions answered.     Chiquis Gorman MD on 6/7/22

## 2022-06-08 LAB
ALBUMIN SERPL-MCNC: 4.7 G/DL (ref 3.5–5.2)
ALP BLD-CCNC: 80 U/L (ref 35–104)
ALT SERPL-CCNC: 16 U/L (ref 0–32)
ANION GAP SERPL CALCULATED.3IONS-SCNC: 19 MMOL/L (ref 7–16)
AST SERPL-CCNC: 26 U/L (ref 0–31)
BILIRUB SERPL-MCNC: 0.3 MG/DL (ref 0–1.2)
BUN BLDV-MCNC: 12 MG/DL (ref 6–23)
CALCIUM SERPL-MCNC: 9.9 MG/DL (ref 8.6–10.2)
CHLORIDE BLD-SCNC: 102 MMOL/L (ref 98–107)
CHOLESTEROL, TOTAL: 224 MG/DL (ref 0–199)
CO2: 21 MMOL/L (ref 22–29)
CREAT SERPL-MCNC: 0.7 MG/DL (ref 0.5–1)
FERRITIN: 145 NG/ML
GFR AFRICAN AMERICAN: >60
GFR NON-AFRICAN AMERICAN: >60 ML/MIN/1.73
GLUCOSE BLD-MCNC: 100 MG/DL (ref 74–99)
HDLC SERPL-MCNC: 74 MG/DL
LDL CHOLESTEROL CALCULATED: 132 MG/DL (ref 0–99)
POTASSIUM SERPL-SCNC: 4.9 MMOL/L (ref 3.5–5)
SODIUM BLD-SCNC: 142 MMOL/L (ref 132–146)
TOTAL PROTEIN: 7.7 G/DL (ref 6.4–8.3)
TRIGL SERPL-MCNC: 89 MG/DL (ref 0–149)
TSH SERPL DL<=0.05 MIU/L-ACNC: 2.4 UIU/ML (ref 0.27–4.2)
VLDLC SERPL CALC-MCNC: 18 MG/DL

## 2022-06-08 NOTE — RESULT ENCOUNTER NOTE
Please notify Cyndi that her cholesterol numbers are stable from what they were 4 years ago. Further, a calculated risk of heart disease is very low. Recommend healthy lifestyle and recheck lipid profile over the course of 3 to 5 years. All her other labs were within desired limits.

## 2022-07-19 ENCOUNTER — HOSPITAL ENCOUNTER (OUTPATIENT)
Dept: SLEEP CENTER | Age: 62
Discharge: HOME | End: 2022-07-19
Payer: COMMERCIAL

## 2022-07-19 VITALS
WEIGHT: 113 LBS | BODY MASS INDEX: 22.19 KG/M2 | DIASTOLIC BLOOD PRESSURE: 60 MMHG | HEART RATE: 66 BPM | HEIGHT: 60 IN | OXYGEN SATURATION: 96 % | SYSTOLIC BLOOD PRESSURE: 112 MMHG

## 2022-07-19 DIAGNOSIS — G47.69 SLEEP-RELATED MOVEMENT DISORDER: ICD-10-CM

## 2022-07-19 DIAGNOSIS — F51.5 NIGHTMARE DISORDER: ICD-10-CM

## 2022-07-19 PROCEDURE — 95810 POLYSOM 6/> YRS 4/> PARAM: CPT

## 2022-07-19 ASSESSMENT — SLEEP AND FATIGUE QUESTIONNAIRES
HOW LIKELY ARE YOU TO NOD OFF OR FALL ASLEEP WHEN YOU ARE A PASSENGER IN A CAR FOR AN HOUR WITHOUT A BREAK: 3
HOW LIKELY ARE YOU TO NOD OFF OR FALL ASLEEP WHILE LYING DOWN TO REST IN THE AFTERNOON WHEN CIRCUMSTANCES PERMIT: 2
HOW LIKELY ARE YOU TO NOD OFF OR FALL ASLEEP WHILE SITTING AND READING: 2
ESS TOTAL SCORE: 8
HOW LIKELY ARE YOU TO NOD OFF OR FALL ASLEEP WHILE SITTING QUIETLY AFTER LUNCH WITHOUT ALCOHOL: 0
HOW LIKELY ARE YOU TO NOD OFF OR FALL ASLEEP WHILE SITTING AND TALKING TO SOMEONE: 0
HOW LIKELY ARE YOU TO NOD OFF OR FALL ASLEEP WHILE WATCHING TV: 1
HOW LIKELY ARE YOU TO NOD OFF OR FALL ASLEEP IN A CAR, WHILE STOPPED FOR A FEW MINUTES IN TRAFFIC: 0
HOW LIKELY ARE YOU TO NOD OFF OR FALL ASLEEP WHILE SITTING INACTIVE IN A PUBLIC PLACE: 0

## 2022-08-03 ENCOUNTER — TELEPHONE (OUTPATIENT)
Dept: SLEEP CENTER | Age: 62
End: 2022-08-03

## 2022-08-03 LAB — STATUS: NORMAL

## 2022-08-04 ENCOUNTER — OFFICE VISIT (OUTPATIENT)
Dept: SLEEP CENTER | Age: 62
End: 2022-08-04
Payer: COMMERCIAL

## 2022-08-04 VITALS
BODY MASS INDEX: 22.95 KG/M2 | OXYGEN SATURATION: 99 % | SYSTOLIC BLOOD PRESSURE: 106 MMHG | HEART RATE: 77 BPM | RESPIRATION RATE: 16 BRPM | DIASTOLIC BLOOD PRESSURE: 68 MMHG | HEIGHT: 60 IN | WEIGHT: 116.9 LBS

## 2022-08-04 DIAGNOSIS — G47.50 PARASOMNIA, UNSPECIFIED TYPE: Primary | ICD-10-CM

## 2022-08-04 PROCEDURE — 99204 OFFICE O/P NEW MOD 45 MIN: CPT | Performed by: INTERNAL MEDICINE

## 2022-08-04 ASSESSMENT — SLEEP AND FATIGUE QUESTIONNAIRES
HOW LIKELY ARE YOU TO NOD OFF OR FALL ASLEEP WHILE SITTING AND TALKING TO SOMEONE: 0
HOW LIKELY ARE YOU TO NOD OFF OR FALL ASLEEP WHILE SITTING AND READING: 1
ESS TOTAL SCORE: 4
HOW LIKELY ARE YOU TO NOD OFF OR FALL ASLEEP WHILE LYING DOWN TO REST IN THE AFTERNOON WHEN CIRCUMSTANCES PERMIT: 0
HOW LIKELY ARE YOU TO NOD OFF OR FALL ASLEEP WHILE SITTING QUIETLY AFTER LUNCH WITHOUT ALCOHOL: 0
HOW LIKELY ARE YOU TO NOD OFF OR FALL ASLEEP WHILE WATCHING TV: 0
HOW LIKELY ARE YOU TO NOD OFF OR FALL ASLEEP IN A CAR, WHILE STOPPED FOR A FEW MINUTES IN TRAFFIC: 0
HOW LIKELY ARE YOU TO NOD OFF OR FALL ASLEEP WHILE SITTING INACTIVE IN A PUBLIC PLACE: 0
HOW LIKELY ARE YOU TO NOD OFF OR FALL ASLEEP WHEN YOU ARE A PASSENGER IN A CAR FOR AN HOUR WITHOUT A BREAK: 3

## 2022-08-04 ASSESSMENT — ENCOUNTER SYMPTOMS
ALLERGIC/IMMUNOLOGIC NEGATIVE: 1
EYES NEGATIVE: 1
GASTROINTESTINAL NEGATIVE: 1
RESPIRATORY NEGATIVE: 1

## 2022-08-04 NOTE — PROGRESS NOTES
[facility] Sleep Medicine    Patient Name: Polo Hernandez  Age: 65 y.o.   : 1960    Date of Visit: 22        HPI   Polo Hernandez is a 65 y.o. female with  has a past medical history of Cervical neck pain with evidence of disc disease, Chronic migraine without aura, with intractable migraine, so stated, without mention of status migrainosus, Ear infection, Lumbar back pain, Neck pain, and Uterine fibroid. who presents as a new patient to Sleep Clinic, referred by Dr. Jada Munoz, for Sleep Apnea (Nightmare disorder)  . Pt here for evaluation of sleep behavior disorder. This started 2 years ago. At that time she was going through multiple stressful life events including death of her mother and losing her job. She feels that the stressors have now passed but the dreams have worsened. She has nightmares about 4 nights per week. She is usually dreaming of someone trying to attack her or her family and she is trying to run away or defend herself. She dreamt that she was clawing at her attacker's face but when she woke up she noticed nail markings on her own face. She has also grabbed her  in her sleep when acting out her dreams. She has at times cried out in her sleep or pulling up covers. She has no recollections of doing these behaviors but she does remember the dreams. When she is woken up by her family she does have brief period of confusion which resolves. She usually does not injure herself or her  however there was one instance where in her dream she was getting out of her car to run away from her attacker. She had gotten out of bed and ran into a treadmill and fell. She had no recollection of getting out of bed. Unsure if her eyes were open during these behaviors.      These behaviors tend to happen in the early morning hours, estimated around 2-4 am. She did have a PSG last month due to concern for REM behavior disorder however during that study she did not exhibit parasomnia or REM without atonia. No sleep disordered breathing or PLMS. Goes to bed 9pm and gets up at 4:40am to do morning errands, starts work at [employer]. Est TST 5-6h. When younger she used to sleep 7-8 hours. She is very busy and always doing something during the day. No known parasomnia as a child but when very young she had a recurring dream of a whale sitting on her back and she would wake up with back pain. She has a twin brother who did exhibit night terrors and sleepwalking.      She drinks 2 c coffee in AM   Dinner 6p      Sleep Medicine 7/19/2022   Sitting and reading 2   Watching TV 1   Sitting, inactive in a public place (e.g. a theatre or a meeting) 0   As a passenger in a car for an hour without a break 3   Lying down to rest in the afternoon when circumstances permit 2   Sitting and talking to someone 0   Sitting quietly after a lunch without alcohol 0   In a car, while stopped for a few minutes in traffic 0   Total score 8   Neck circumference (Inches) 12          PMH:  Past Medical History:   Diagnosis Date    Cervical neck pain with evidence of disc disease     Chronic migraine without aura, with intractable migraine, so stated, without mention of status migrainosus     Ear infection     Lumbar back pain     Neck pain     Uterine fibroid         PSH:  Past Surgical History:   Procedure Laterality Date    ABDOMINAL EXPLORATION SURGERY  1990          Soc Hx:  Social History     Tobacco Use    Smoking status: Never    Smokeless tobacco: Never   Substance Use Topics    Alcohol use: No     Comment: (6/29/17):  <1 drink/week    Drug use: No        Fam Hx:  Family History   Problem Relation Age of Onset    High Cholesterol Mother         Current Outpatient Medications   Medication Sig Dispense Refill    SODIUM FLUORIDE 5000 PLUS 1.1 % CREA use as directed      hydrOXYzine HCl (ATARAX) 10 MG tablet Take 1 tablet by mouth nightly 90 tablet 1    Galcanezumab-gnlm (EMGALITY) 120 MG/ML SOAJ Inject 1 mL into the skin every 30 days 1 mL 11    SUMAtriptan (IMITREX) 100 MG tablet Take 1 tablet by mouth as needed for Migraine 9 tablet 11    fluticasone (FLONASE) 50 MCG/ACT nasal spray 2 sprays by Each Nostril route as needed for Rhinitis or Allergies 1 Bottle 11    polyethylene glycol (GLYCOLAX) powder Use 17 g (1 dose) Q MWF, off other days PRN 1530 g 1    Multiple Vitamins-Minerals (THERAPEUTIC MULTIVITAMIN-MINERALS) tablet Take 1 tablet by mouth daily Indications: Vitamin and Mineral Deficiency      Cholecalciferol (VITAMIN D3) 2000 units CAPS Take 1 capsule by mouth daily      Calcium-Magnesium-Zinc (NICHOLE-MAG-ZINC PO) Take 1 tablet by mouth daily Indications: Vitamin and Mineral Deficiency       No current facility-administered medications for this visit. Review of Systems  (Sleep ROS above)  Review of Systems   HENT: Negative. Eyes: Negative. Respiratory: Negative. Cardiovascular: Negative. Gastrointestinal: Negative. Endocrine: Negative. Genitourinary: Negative. Musculoskeletal:         Notes osteoporosis   Skin: Negative. Allergic/Immunologic: Negative. Neurological:  Positive for headaches (hx migraines). Hematological: Negative. Psychiatric/Behavioral:  Positive for sleep disturbance. Objective:   Physical Exam  There were no vitals taken for this visit. Physical Exam  Vitals reviewed. Constitutional:       Appearance: Normal appearance. HENT:      Head: Atraumatic. Eyes:      Extraocular Movements: Extraocular movements intact. Cardiovascular:      Rate and Rhythm: Normal rate and regular rhythm. Pulmonary:      Effort: Pulmonary effort is normal.      Breath sounds: Normal breath sounds. Musculoskeletal:         General: No deformity. Skin:     General: Skin is warm and dry. Neurological:      General: No focal deficit present. Mental Status: She is alert.       Comments: No rigidity    Psychiatric:         Mood and Affect: Mood normal.           Sleep Medicine 7/19/2022   Sitting and reading 2   Watching TV 1   Sitting, inactive in a public place (e.g. a theatre or a meeting) 0   As a passenger in a car for an hour without a break 3   Lying down to rest in the afternoon when circumstances permit 2   Sitting and talking to someone 0   Sitting quietly after a lunch without alcohol 0   In a car, while stopped for a few minutes in traffic 0   Total score 8   Neck circumference (Inches) 12         SLEEP STUDY HISTORY      No specialty comments available. PERTINENT LAB RESULTS  Ferritin   Date Value Ref Range Status   06/07/2022 145 ng/mL Final     Comment:     FERRITIN Reference Ranges:  Adult Males   20 - 60 years:    27 - 400 ng/mL  Adult females 16 - 61 years:    15 - 150 ng/mL  Adults greater than 60 years:   no established reference range  Pediatrics:                     no established reference range            Assessment:      Cyndi was seen today for sleep apnea. 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 The familial component with twin brother who exhibited sleepwalking further supports somnabulism and sleep terrors. Discussed safety measures to prevent injury from getting out of bed. Will complete sleep diary and review at next visit in attempt to identify any patterns associated with parasomnia. Will also try a short period of time without caffeine to see if there is any change. Would like her to wean off the caffeine so she does not have caffeine withdrawal migraines. Patient will follow up Return in about 6 weeks (around 9/15/2022).     Johnnie Bell,   Sleep Medicine   [facility] Phone -[phone], option 2  Fax- [phone]

## 2022-09-13 ENCOUNTER — TELEPHONE (OUTPATIENT)
Dept: FAMILY MEDICINE CLINIC | Age: 62
End: 2022-09-13

## 2022-09-13 NOTE — TELEPHONE ENCOUNTER
----- Message from [employer] sent at 9/13/2022  8:32 AM EDT -----  Subject: Message to Provider    QUESTIONS  Information for Provider? Patient is faxing over a form from her insurance   company. Please call patient when screening form is complete.  ---------------------------------------------------------------------------  --------------  Juanpablo Bishop INFO  [phone]; OK to leave message on voicemail  ---------------------------------------------------------------------------  --------------  SCRIPT ANSWERS  Relationship to Patient?  Self

## 2022-09-14 ENCOUNTER — TELEPHONE (OUTPATIENT)
Dept: FAMILY MEDICINE CLINIC | Age: 62
End: 2022-09-14

## 2022-09-16 NOTE — TELEPHONE ENCOUNTER
Hi, the paperwork is scanned in media in your chart so you would not be able to see that. The original paperwork is ready for you to come .

## 2022-12-27 LAB — MAMMOGRAPHY, EXTERNAL: NORMAL

## 2023-10-13 ENCOUNTER — OFFICE VISIT (OUTPATIENT)
Dept: FAMILY MEDICINE CLINIC | Age: 63
End: 2023-10-13

## 2023-10-13 VITALS
TEMPERATURE: 97.8 F | BODY MASS INDEX: 22.7 KG/M2 | HEIGHT: 60 IN | SYSTOLIC BLOOD PRESSURE: 110 MMHG | RESPIRATION RATE: 16 BRPM | HEART RATE: 74 BPM | OXYGEN SATURATION: 99 % | WEIGHT: 115.6 LBS | DIASTOLIC BLOOD PRESSURE: 68 MMHG

## 2023-10-13 DIAGNOSIS — Z13.31 DEPRESSION SCREEN: ICD-10-CM

## 2023-10-13 DIAGNOSIS — G43.711 CHRONIC MIGRAINE WITHOUT AURA, WITH INTRACTABLE MIGRAINE, SO STATED, WITH STATUS MIGRAINOSUS: ICD-10-CM

## 2023-10-13 DIAGNOSIS — G43.001 MIGRAINE WITHOUT AURA AND WITH STATUS MIGRAINOSUS, NOT INTRACTABLE: ICD-10-CM

## 2023-10-13 DIAGNOSIS — Z00.00 ROUTINE HEALTH MAINTENANCE: Primary | ICD-10-CM

## 2023-10-13 RX ORDER — GALCANEZUMAB 120 MG/ML
1 INJECTION, SOLUTION SUBCUTANEOUS
Qty: 1 ML | Refills: 11 | Status: SHIPPED | OUTPATIENT
Start: 2023-10-13

## 2023-10-13 RX ORDER — SUMATRIPTAN 100 MG/1
100 TABLET, FILM COATED ORAL PRN
Qty: 9 TABLET | Refills: 11 | Status: SHIPPED | OUTPATIENT
Start: 2023-10-13

## 2023-10-13 SDOH — ECONOMIC STABILITY: HOUSING INSECURITY
IN THE LAST 12 MONTHS, WAS THERE A TIME WHEN YOU DID NOT HAVE A STEADY PLACE TO SLEEP OR SLEPT IN A SHELTER (INCLUDING NOW)?: NO

## 2023-10-13 SDOH — ECONOMIC STABILITY: FOOD INSECURITY: WITHIN THE PAST 12 MONTHS, THE FOOD YOU BOUGHT JUST DIDN'T LAST AND YOU DIDN'T HAVE MONEY TO GET MORE.: NEVER TRUE

## 2023-10-13 SDOH — ECONOMIC STABILITY: INCOME INSECURITY: HOW HARD IS IT FOR YOU TO PAY FOR THE VERY BASICS LIKE FOOD, HOUSING, MEDICAL CARE, AND HEATING?: NOT HARD AT ALL

## 2023-10-13 SDOH — ECONOMIC STABILITY: FOOD INSECURITY: WITHIN THE PAST 12 MONTHS, YOU WORRIED THAT YOUR FOOD WOULD RUN OUT BEFORE YOU GOT MONEY TO BUY MORE.: NEVER TRUE

## 2023-10-13 ASSESSMENT — PATIENT HEALTH QUESTIONNAIRE - PHQ9
2. FEELING DOWN, DEPRESSED OR HOPELESS: 0
SUM OF ALL RESPONSES TO PHQ QUESTIONS 1-9: 0
SUM OF ALL RESPONSES TO PHQ9 QUESTIONS 1 & 2: 0
1. LITTLE INTEREST OR PLEASURE IN DOING THINGS: 0

## 2023-10-13 NOTE — PROGRESS NOTES
Reactions    Azithromycin     Clindamycin/Lincomycin     Penicillins      hives    Prochlorperazine Edisylate      AKA Compazine    Adhesive Tape Rash       Past Medical History:   Diagnosis Date    Cervical neck pain with evidence of disc disease     Chronic migraine without aura, with intractable migraine, so stated, without mention of status migrainosus     Ear infection     Lumbar back pain     Neck pain     Uterine fibroid        Past Surgical History:   Procedure Laterality Date    ABDOMINAL EXPLORATION SURGERY  1990       Social History     Socioeconomic History    Marital status:      Spouse name: Not on file    Number of children: Not on file    Years of education: Not on file    Highest education level: Not on file   Occupational History    Not on file   Tobacco Use    Smoking status: Never    Smokeless tobacco: Never   Substance and Sexual Activity    Alcohol use: No     Comment: (6/29/17):  <1 drink/week    Drug use: No    Sexual activity: Yes     Partners: Male     Comment: (6/29/17):  since 1984. Postmenopausal   Other Topics Concern    Not on file   Social History Narrative    Not on file     Social Determinants of Health     Financial Resource Strain: Low Risk  (10/13/2023)    Overall Financial Resource Strain (CARDIA)     Difficulty of Paying Living Expenses: Not hard at all   Food Insecurity: No Food Insecurity (10/13/2023)    Hunger Vital Sign     Worried About Running Out of Food in the Last Year: Never true     Ran Out of Food in the Last Year: Never true   Transportation Needs: Unknown (10/13/2023)    PRAPARE - Transportation     Lack of Transportation (Medical): Not on file     Lack of Transportation (Non-Medical):  No   Physical Activity: Not on file   Stress: Not on file   Social Connections: Not on file   Intimate Partner Violence: Not on file   Housing Stability: Unknown (10/13/2023)    Housing Stability Vital Sign     Unable to Pay for Housing in the Last Year: Not on file

## 2023-11-28 ENCOUNTER — OFFICE VISIT (OUTPATIENT)
Dept: FAMILY MEDICINE CLINIC | Age: 63
End: 2023-11-28
Payer: COMMERCIAL

## 2023-11-28 VITALS
WEIGHT: 116.6 LBS | BODY MASS INDEX: 22.89 KG/M2 | HEIGHT: 60 IN | HEART RATE: 103 BPM | OXYGEN SATURATION: 99 % | DIASTOLIC BLOOD PRESSURE: 58 MMHG | TEMPERATURE: 97.2 F | SYSTOLIC BLOOD PRESSURE: 104 MMHG | RESPIRATION RATE: 16 BRPM

## 2023-11-28 DIAGNOSIS — Z20.828 EXPOSURE TO THE FLU: ICD-10-CM

## 2023-11-28 DIAGNOSIS — R68.89 FLU-LIKE SYMPTOMS: Primary | ICD-10-CM

## 2023-11-28 DIAGNOSIS — J02.9 SORE THROAT: ICD-10-CM

## 2023-11-28 LAB
INFLUENZA A ANTIGEN, POC: NEGATIVE
INFLUENZA B ANTIGEN, POC: NEGATIVE
LOT EXPIRE DATE: NORMAL
LOT KIT NUMBER: NORMAL
S PYO AG THROAT QL: NORMAL
SARS-COV-2, POC: NORMAL
VALID INTERNAL CONTROL: NORMAL
VENDOR AND KIT NAME POC: NORMAL

## 2023-11-28 PROCEDURE — G8427 DOCREV CUR MEDS BY ELIG CLIN: HCPCS | Performed by: NURSE PRACTITIONER

## 2023-11-28 PROCEDURE — 3017F COLORECTAL CA SCREEN DOC REV: CPT | Performed by: NURSE PRACTITIONER

## 2023-11-28 PROCEDURE — 87880 STREP A ASSAY W/OPTIC: CPT | Performed by: NURSE PRACTITIONER

## 2023-11-28 PROCEDURE — G8484 FLU IMMUNIZE NO ADMIN: HCPCS | Performed by: NURSE PRACTITIONER

## 2023-11-28 PROCEDURE — 1036F TOBACCO NON-USER: CPT | Performed by: NURSE PRACTITIONER

## 2023-11-28 PROCEDURE — G8420 CALC BMI NORM PARAMETERS: HCPCS | Performed by: NURSE PRACTITIONER

## 2023-11-28 PROCEDURE — 87428 SARSCOV & INF VIR A&B AG IA: CPT | Performed by: NURSE PRACTITIONER

## 2023-11-28 PROCEDURE — 99214 OFFICE O/P EST MOD 30 MIN: CPT | Performed by: NURSE PRACTITIONER

## 2023-11-28 RX ORDER — OSELTAMIVIR PHOSPHATE 75 MG/1
75 CAPSULE ORAL 2 TIMES DAILY
Qty: 10 CAPSULE | Refills: 0 | Status: SHIPPED | OUTPATIENT
Start: 2023-11-28 | End: 2023-12-03

## 2023-11-28 NOTE — PROGRESS NOTES
23  Cyndi ASIF [place] : 1960 Sex: female  Age 59 y.o. Subjective:  Chief Complaint   Patient presents with    Cough     Started     Congestion    Fever     Body aches       HPI:    [address] , 59 y.o. female presents to the clinic for evaluation of cough x 2 days. The patient also reports sore throat, sinus congestion, body aches, fever (max 100.3 F). The patient has taken Mucinex DM, Vicks Sinus for symptoms. The patient reports worsening symptoms over time. The patient reports Flu ill exposure (). The patient denies headache, rash. The patient also denies chest pain, abdominal pain, shortness of breath, and nausea / vomiting / diarrhea. ROS:   Unless otherwise stated in this report the patient's positive and negative responses for review of systems for constitutional, eyes, ENT, cardiovascular, respiratory, gastrointestinal, neurological, , musculoskeletal, and integument systems and related systems to the presenting problem are either stated in the history of present illness or were not pertinent or were negative for the symptoms and/or complaints related to the presenting medical problem. Positives and pertinent negatives as per HPI. All others reviewed and are negative.       PMH:     Past Medical History:   Diagnosis Date    Cervical neck pain with evidence of disc disease     Chronic migraine without aura, with intractable migraine, so stated, without mention of status migrainosus     Ear infection     Lumbar back pain     Neck pain     Uterine fibroid        Past Surgical History:   Procedure Laterality Date    ABDOMINAL EXPLORATION SURGERY         Family History   Problem Relation Age of Onset    High Cholesterol Mother        Medications:     Current Outpatient Medications:     oseltamivir (TAMIFLU) 75 MG capsule, Take 1 capsule by mouth 2 times daily for 5 days, Disp: 10 capsule, Rfl: 0    SUMAtriptan (IMITREX) 100 MG tablet, Take 1 tablet by mouth as

## 2024-01-15 LAB — MAMMOGRAPHY, EXTERNAL: NORMAL

## 2024-02-06 ENCOUNTER — OFFICE VISIT (OUTPATIENT)
Dept: FAMILY MEDICINE CLINIC | Age: 64
End: 2024-02-06
Payer: COMMERCIAL

## 2024-02-06 VITALS
DIASTOLIC BLOOD PRESSURE: 72 MMHG | SYSTOLIC BLOOD PRESSURE: 108 MMHG | WEIGHT: 116 LBS | HEIGHT: 60 IN | OXYGEN SATURATION: 99 % | HEART RATE: 75 BPM | BODY MASS INDEX: 22.78 KG/M2 | RESPIRATION RATE: 18 BRPM | TEMPERATURE: 97.3 F

## 2024-02-06 DIAGNOSIS — G43.711 CHRONIC MIGRAINE WITHOUT AURA, WITH INTRACTABLE MIGRAINE, SO STATED, WITH STATUS MIGRAINOSUS: ICD-10-CM

## 2024-02-06 DIAGNOSIS — Z12.11 COLON CANCER SCREENING: ICD-10-CM

## 2024-02-06 DIAGNOSIS — Z13.220 LIPID SCREENING: ICD-10-CM

## 2024-02-06 DIAGNOSIS — G43.711 CHRONIC MIGRAINE WITHOUT AURA, WITH INTRACTABLE MIGRAINE, SO STATED, WITH STATUS MIGRAINOSUS: Primary | ICD-10-CM

## 2024-02-06 DIAGNOSIS — G47.50 PARASOMNIA, UNSPECIFIED TYPE: ICD-10-CM

## 2024-02-06 LAB
ABSOLUTE IMMATURE GRANULOCYTE: <0.03 K/UL (ref 0–0.58)
ALBUMIN SERPL-MCNC: 4.5 G/DL (ref 3.5–5.2)
ALP BLD-CCNC: 73 U/L (ref 35–104)
ALT SERPL-CCNC: 12 U/L (ref 0–32)
ANION GAP SERPL CALCULATED.3IONS-SCNC: 16 MMOL/L (ref 7–16)
AST SERPL-CCNC: 23 U/L (ref 0–31)
BASOPHILS ABSOLUTE: 0.08 K/UL (ref 0–0.2)
BASOPHILS RELATIVE PERCENT: 1 % (ref 0–2)
BILIRUB SERPL-MCNC: 0.5 MG/DL (ref 0–1.2)
BUN BLDV-MCNC: 12 MG/DL (ref 6–23)
CALCIUM SERPL-MCNC: 10.3 MG/DL (ref 8.6–10.2)
CHLORIDE BLD-SCNC: 102 MMOL/L (ref 98–107)
CHOLESTEROL: 227 MG/DL
CO2: 25 MMOL/L (ref 22–29)
CREAT SERPL-MCNC: 0.6 MG/DL (ref 0.5–1)
EOSINOPHILS ABSOLUTE: 0.17 K/UL (ref 0.05–0.5)
EOSINOPHILS RELATIVE PERCENT: 2 % (ref 0–6)
GFR SERPL CREATININE-BSD FRML MDRD: >60 ML/MIN/1.73M2
GLUCOSE BLD-MCNC: 95 MG/DL (ref 74–99)
HCT VFR BLD CALC: 43.7 % (ref 34–48)
HDLC SERPL-MCNC: 70 MG/DL
HEMOGLOBIN: 14.2 G/DL (ref 11.5–15.5)
IMMATURE GRANULOCYTES: 0 % (ref 0–5)
LDL CHOLESTEROL: 138 MG/DL
LYMPHOCYTES ABSOLUTE: 1.81 K/UL (ref 1.5–4)
LYMPHOCYTES RELATIVE PERCENT: 26 % (ref 20–42)
MCH RBC QN AUTO: 30.9 PG (ref 26–35)
MCHC RBC AUTO-ENTMCNC: 32.5 G/DL (ref 32–34.5)
MCV RBC AUTO: 95.2 FL (ref 80–99.9)
MONOCYTES ABSOLUTE: 0.41 K/UL (ref 0.1–0.95)
MONOCYTES RELATIVE PERCENT: 6 % (ref 2–12)
NEUTROPHILS ABSOLUTE: 4.57 K/UL (ref 1.8–7.3)
NEUTROPHILS RELATIVE PERCENT: 65 % (ref 43–80)
PDW BLD-RTO: 12.3 % (ref 11.5–15)
PLATELET # BLD: 262 K/UL (ref 130–450)
PMV BLD AUTO: 9.9 FL (ref 7–12)
POTASSIUM SERPL-SCNC: 4.1 MMOL/L (ref 3.5–5)
RBC # BLD: 4.59 M/UL (ref 3.5–5.5)
SODIUM BLD-SCNC: 143 MMOL/L (ref 132–146)
TOTAL PROTEIN: 7.5 G/DL (ref 6.4–8.3)
TRIGL SERPL-MCNC: 93 MG/DL
TSH SERPL DL<=0.05 MIU/L-ACNC: 3.06 UIU/ML (ref 0.27–4.2)
URIC ACID: 3.2 MG/DL (ref 2.4–5.7)
VITAMIN D 25-HYDROXY: 76 NG/ML (ref 30–100)
VLDLC SERPL CALC-MCNC: 19 MG/DL
WBC # BLD: 7.1 K/UL (ref 4.5–11.5)

## 2024-02-06 PROCEDURE — G8484 FLU IMMUNIZE NO ADMIN: HCPCS | Performed by: FAMILY MEDICINE

## 2024-02-06 PROCEDURE — G8420 CALC BMI NORM PARAMETERS: HCPCS | Performed by: FAMILY MEDICINE

## 2024-02-06 PROCEDURE — 3017F COLORECTAL CA SCREEN DOC REV: CPT | Performed by: FAMILY MEDICINE

## 2024-02-06 PROCEDURE — 1036F TOBACCO NON-USER: CPT | Performed by: FAMILY MEDICINE

## 2024-02-06 PROCEDURE — 99214 OFFICE O/P EST MOD 30 MIN: CPT | Performed by: FAMILY MEDICINE

## 2024-02-06 PROCEDURE — 36415 COLL VENOUS BLD VENIPUNCTURE: CPT | Performed by: FAMILY MEDICINE

## 2024-02-06 PROCEDURE — G8427 DOCREV CUR MEDS BY ELIG CLIN: HCPCS | Performed by: FAMILY MEDICINE

## 2024-02-06 RX ORDER — SUMATRIPTAN 100 MG/1
100 TABLET, FILM COATED ORAL PRN
Qty: 9 TABLET | Refills: 0 | Status: SHIPPED | OUTPATIENT
Start: 2024-02-06

## 2024-02-06 NOTE — PROGRESS NOTES
Venipuncture was obtained from left arm, with 1 attempt. Patient tolerated the procedure without complications or complaints.  Electronically signed by Grace Rivera LPN on 2/6/24 at 10:57 AM EST

## 2024-02-06 NOTE — PATIENT INSTRUCTIONS
Soraya Dalal, Counselor, [place], [place], [email or web address] ... › Phoenixkatrina Dalal, Counselor, [place], [address], [phone]    [facility], [facility]. [facility]. [address] [place] [address]. [place] [address]. [phone].    [facility], [place], [place] · [phone]

## 2024-02-06 NOTE — PROGRESS NOTES
[facility]  Family Medicine Outpatient        SUBJECTIVE:  CC: had concerns including New Patient (Pt here to establish care with Dr Vida Del Angel, used to see Merline Marino MD. Previous Dr went down to part time and could not get in to see her.).  HPI:Cyndi Geller presented to the clinic for a new provider visit.     Last labs done 6/2022. 10/2023 last saw primary care at [place]. Follows with Dr. Schwarz for female health care. Has been under more stress lately. Notes that her father passed away in November. The family had some drama after his death to evict his girlfriend to get into the house. Prior to this event her Mom had passed 3 years ago.     Review of Systems   Constitutional:  Negative for appetite change, fatigue and fever.   Respiratory:  Negative for cough, shortness of breath and wheezing.    Cardiovascular:  Negative for chest pain and palpitations.   Gastrointestinal:  Negative for abdominal pain, constipation, diarrhea, nausea and vomiting.   Neurological:  Positive for headaches. Negative for dizziness.       Outpatient Medications Marked as Taking for the 2/6/24 encounter (Office Visit) with Vida Del Angel MD   Medication Sig Dispense Refill    SUMAtriptan (IMITREX) 100 MG tablet Take 1 tablet by mouth as needed for Migraine 9 tablet 0    Galcanezumab-gnlm (EMGALITY) 120 MG/ML SOAJ Inject 1 mL into the skin every 30 days 1 mL 11    SODIUM FLUORIDE 5000 PLUS 1.1 % CREA use as directed      fluticasone (FLONASE) 50 MCG/ACT nasal spray 2 sprays by Each Nostril route as needed for Rhinitis or Allergies (Patient taking differently: 2 sprays by Each Nostril route as needed for Rhinitis or Allergies PRN) 1 Bottle 11    polyethylene glycol (GLYCOLAX) powder Use 17 g (1 dose) Q MWF, off other days PRN 1530 g 1    Multiple Vitamins-Minerals (THERAPEUTIC MULTIVITAMIN-MINERALS) tablet Take 1 tablet by mouth daily Indications: Vitamin and/or Mineral Deficiency

## 2024-03-24 LAB — NONINV COLON CA DNA+OCC BLD SCRN STL QL: NEGATIVE

## 2024-08-22 ENCOUNTER — OFFICE VISIT (OUTPATIENT)
Dept: FAMILY MEDICINE CLINIC | Age: 64
End: 2024-08-22
Payer: COMMERCIAL

## 2024-08-22 VITALS
WEIGHT: 114.2 LBS | DIASTOLIC BLOOD PRESSURE: 70 MMHG | SYSTOLIC BLOOD PRESSURE: 108 MMHG | HEART RATE: 70 BPM | BODY MASS INDEX: 22.42 KG/M2 | TEMPERATURE: 97.9 F | RESPIRATION RATE: 18 BRPM | OXYGEN SATURATION: 98 % | HEIGHT: 60 IN

## 2024-08-22 DIAGNOSIS — G43.711 CHRONIC MIGRAINE WITHOUT AURA, WITH INTRACTABLE MIGRAINE, SO STATED, WITH STATUS MIGRAINOSUS: Primary | ICD-10-CM

## 2024-08-22 DIAGNOSIS — E78.2 MIXED HYPERLIPIDEMIA: ICD-10-CM

## 2024-08-22 DIAGNOSIS — F39 MOOD DISORDER (HCC): ICD-10-CM

## 2024-08-22 DIAGNOSIS — K59.09 CHRONIC CONSTIPATION: ICD-10-CM

## 2024-08-22 PROCEDURE — 3017F COLORECTAL CA SCREEN DOC REV: CPT | Performed by: FAMILY MEDICINE

## 2024-08-22 PROCEDURE — G2211 COMPLEX E/M VISIT ADD ON: HCPCS | Performed by: FAMILY MEDICINE

## 2024-08-22 PROCEDURE — 1036F TOBACCO NON-USER: CPT | Performed by: FAMILY MEDICINE

## 2024-08-22 PROCEDURE — 99214 OFFICE O/P EST MOD 30 MIN: CPT | Performed by: FAMILY MEDICINE

## 2024-08-22 PROCEDURE — G8427 DOCREV CUR MEDS BY ELIG CLIN: HCPCS | Performed by: FAMILY MEDICINE

## 2024-08-22 PROCEDURE — G8420 CALC BMI NORM PARAMETERS: HCPCS | Performed by: FAMILY MEDICINE

## 2024-08-22 NOTE — PROGRESS NOTES
Tenderness: There is no abdominal tenderness.      Hernia: No hernia is present.   Musculoskeletal:      Cervical back: Normal range of motion and neck supple.   Skin:     General: Skin is warm and dry.   Neurological:      Mental Status: She is alert and oriented to person, place, and time.       ASSESSMENT/PLAN:  1. Chronic migraine without aura, with intractable migraine, so stated, with status migrainosus  Stable. Continue Emgality and prn Imitrex.   - SUMAtriptan (IMITREX) 100 MG tablet; Take 1 tablet by mouth as needed for Migraine  Dispense: 9 tablet; Refill: 0  - Galcanezumab-gnlm (EMGALITY) 120 MG/ML SOAJ; Inject 1 mL into the skin every 30 days  Dispense: 1 mL; Refill: 2    2. Mixed hyperlipidemia    3. Chronic constipation  With normal bm for years. Miralax prn. Using once every other week. Encourage increased hydration and fiber in diet. Benign abdominal exam. Stressed importance of regular bowel movements. Trial teaspoon of Miralax qd prn.     4. Mood disorder  Adjustment disorder with Anxiety. Since her Mom got sick. Still closing her Mother's estate. Her  is sick. Otc Ashwaghanda with prn Hydroxyzine. Started seeing a counselor in May.     I have reviewed my findings and recommendations with Cyndi Del Angel MD  9/2/2024 6:42 PM  Return in about 6 months (around 2/22/2025).     Counseled regarding above diagnosis, including possible risks and complications, especially if left uncontrolled.    If any symptoms worsen, progress, or new symptoms occur patient advised on acute reevaluation. If symptoms persist after recommended course patient advised on reevaluation with pcp or follow up with specialist. Patient counseled on red flag symptoms and if they occur to go to the ED.      Discussed medications risk/benefits and possible side effects and alternatives to treatment. Patient and/or guardian verbalizes understanding, agrees, feels comfortable with and wishes to proceed

## 2024-08-29 ENCOUNTER — NURSE ONLY (OUTPATIENT)
Dept: FAMILY MEDICINE CLINIC | Age: 64
End: 2024-08-29
Payer: COMMERCIAL

## 2024-08-29 DIAGNOSIS — G43.711 CHRONIC MIGRAINE WITHOUT AURA, WITH INTRACTABLE MIGRAINE, SO STATED, WITH STATUS MIGRAINOSUS: ICD-10-CM

## 2024-08-29 LAB
ALBUMIN: 4.3 G/DL (ref 3.5–5.2)
ALP BLD-CCNC: 70 U/L (ref 35–104)
ALT SERPL-CCNC: 11 U/L (ref 0–32)
ANION GAP SERPL CALCULATED.3IONS-SCNC: 12 MMOL/L (ref 7–16)
AST SERPL-CCNC: 21 U/L (ref 0–31)
BILIRUB SERPL-MCNC: 0.5 MG/DL (ref 0–1.2)
BUN BLDV-MCNC: 10 MG/DL (ref 6–23)
CALCIUM SERPL-MCNC: 9.8 MG/DL (ref 8.6–10.2)
CHLORIDE BLD-SCNC: 103 MMOL/L (ref 98–107)
CO2: 25 MMOL/L (ref 22–29)
CREAT SERPL-MCNC: 0.6 MG/DL (ref 0.5–1)
GFR, ESTIMATED: >90 ML/MIN/1.73M2
GLUCOSE BLD-MCNC: 95 MG/DL (ref 74–99)
HCT VFR BLD CALC: 42.8 % (ref 34–48)
HEMOGLOBIN: 14.2 G/DL (ref 11.5–15.5)
MCH RBC QN AUTO: 31.3 PG (ref 26–35)
MCHC RBC AUTO-ENTMCNC: 33.2 G/DL (ref 32–34.5)
MCV RBC AUTO: 94.3 FL (ref 80–99.9)
PDW BLD-RTO: 12.4 % (ref 11.5–15)
PLATELET # BLD: 224 K/UL (ref 130–450)
PMV BLD AUTO: 10.1 FL (ref 7–12)
POTASSIUM SERPL-SCNC: 4.4 MMOL/L (ref 3.5–5)
RBC # BLD: 4.54 M/UL (ref 3.5–5.5)
SODIUM BLD-SCNC: 140 MMOL/L (ref 132–146)
TOTAL PROTEIN: 7 G/DL (ref 6.4–8.3)
WBC # BLD: 6.9 K/UL (ref 4.5–11.5)

## 2024-08-29 PROCEDURE — 36415 COLL VENOUS BLD VENIPUNCTURE: CPT | Performed by: FAMILY MEDICINE

## 2024-08-29 RX ORDER — SUMATRIPTAN 100 MG/1
100 TABLET, FILM COATED ORAL PRN
Qty: 9 TABLET | Refills: 0 | Status: SHIPPED | OUTPATIENT
Start: 2024-08-29

## 2024-08-29 RX ORDER — GALCANEZUMAB 120 MG/ML
1 INJECTION, SOLUTION SUBCUTANEOUS
Qty: 1 ML | Refills: 2 | Status: SHIPPED | OUTPATIENT
Start: 2024-08-29

## 2024-08-29 NOTE — PROGRESS NOTES
Venipuncture was obtained from right arm. Patient tolerated the procedure without complications or complaints.    Electronically signed by Cayla Musa LPN on 8/29/24 at 8:46 AM EDT

## 2024-12-09 DIAGNOSIS — G43.711 CHRONIC MIGRAINE WITHOUT AURA, WITH INTRACTABLE MIGRAINE, SO STATED, WITH STATUS MIGRAINOSUS: ICD-10-CM

## 2024-12-09 NOTE — TELEPHONE ENCOUNTER
Name of Medication(s) Requested:  Requested Prescriptions     Pending Prescriptions Disp Refills    Galcanezumab-gnlm (EMGALITY) 120 MG/ML SOAJ 1 mL 2     Sig: Inject 1 mL into the skin every 30 days       Medication is on current medication list Yes    Dosage and directions were verified? Yes    Quantity verified: 30 day supply     Pharmacy Verified?  Yes    Last Appointment:  8/22/2024    Future appts:  Future Appointments   Date Time Provider Department Center   2/27/2025  8:00 AM Vida Del Angel MD [facility] DEP        (If no appt send self scheduling link. .REFILLAPPT)  Scheduling request sent?     [] Yes  [x] No    Does patient need updated?  [] Yes  [x] No

## 2024-12-11 RX ORDER — GALCANEZUMAB 120 MG/ML
1 INJECTION, SOLUTION SUBCUTANEOUS
Qty: 1 ML | Refills: 2 | Status: SHIPPED | OUTPATIENT
Start: 2024-12-11

## 2025-01-20 LAB — MAMMOGRAPHY, EXTERNAL: NORMAL

## 2025-02-26 SDOH — ECONOMIC STABILITY: TRANSPORTATION INSECURITY
IN THE PAST 12 MONTHS, HAS LACK OF TRANSPORTATION KEPT YOU FROM MEETINGS, WORK, OR FROM GETTING THINGS NEEDED FOR DAILY LIVING?: NO

## 2025-02-26 SDOH — ECONOMIC STABILITY: FOOD INSECURITY: WITHIN THE PAST 12 MONTHS, YOU WORRIED THAT YOUR FOOD WOULD RUN OUT BEFORE YOU GOT MONEY TO BUY MORE.: NEVER TRUE

## 2025-02-26 SDOH — ECONOMIC STABILITY: INCOME INSECURITY: IN THE LAST 12 MONTHS, WAS THERE A TIME WHEN YOU WERE NOT ABLE TO PAY THE MORTGAGE OR RENT ON TIME?: NO

## 2025-02-26 SDOH — ECONOMIC STABILITY: FOOD INSECURITY: WITHIN THE PAST 12 MONTHS, THE FOOD YOU BOUGHT JUST DIDN'T LAST AND YOU DIDN'T HAVE MONEY TO GET MORE.: NEVER TRUE

## 2025-02-26 SDOH — ECONOMIC STABILITY: TRANSPORTATION INSECURITY
IN THE PAST 12 MONTHS, HAS THE LACK OF TRANSPORTATION KEPT YOU FROM MEDICAL APPOINTMENTS OR FROM GETTING MEDICATIONS?: NO

## 2025-02-27 ENCOUNTER — OFFICE VISIT (OUTPATIENT)
Dept: FAMILY MEDICINE CLINIC | Age: 65
End: 2025-02-27
Payer: COMMERCIAL

## 2025-02-27 VITALS
WEIGHT: 116.2 LBS | RESPIRATION RATE: 18 BRPM | BODY MASS INDEX: 22.81 KG/M2 | OXYGEN SATURATION: 99 % | HEIGHT: 60 IN | HEART RATE: 73 BPM | DIASTOLIC BLOOD PRESSURE: 64 MMHG | SYSTOLIC BLOOD PRESSURE: 108 MMHG | TEMPERATURE: 98 F

## 2025-02-27 DIAGNOSIS — F41.9 ANXIETY: ICD-10-CM

## 2025-02-27 DIAGNOSIS — M81.0 AGE-RELATED OSTEOPOROSIS WITHOUT CURRENT PATHOLOGICAL FRACTURE: ICD-10-CM

## 2025-02-27 DIAGNOSIS — R53.83 FATIGUE, UNSPECIFIED TYPE: ICD-10-CM

## 2025-02-27 DIAGNOSIS — G43.711 CHRONIC MIGRAINE WITHOUT AURA, WITH INTRACTABLE MIGRAINE, SO STATED, WITH STATUS MIGRAINOSUS: Primary | ICD-10-CM

## 2025-02-27 LAB
ALBUMIN: 4.3 G/DL (ref 3.5–5.2)
ALP BLD-CCNC: 68 U/L (ref 35–104)
ALT SERPL-CCNC: 13 U/L (ref 0–32)
ANION GAP SERPL CALCULATED.3IONS-SCNC: 19 MMOL/L (ref 7–16)
AST SERPL-CCNC: 23 U/L (ref 0–31)
BASOPHILS ABSOLUTE: 0.08 K/UL (ref 0–0.2)
BASOPHILS RELATIVE PERCENT: 1 % (ref 0–2)
BILIRUB SERPL-MCNC: 0.5 MG/DL (ref 0–1.2)
BUN BLDV-MCNC: 14 MG/DL (ref 6–23)
CALCIUM SERPL-MCNC: 9.8 MG/DL (ref 8.6–10.2)
CHLORIDE BLD-SCNC: 100 MMOL/L (ref 98–107)
CHOLESTEROL, TOTAL: 234 MG/DL
CO2: 20 MMOL/L (ref 22–29)
CREAT SERPL-MCNC: 0.7 MG/DL (ref 0.5–1)
EOSINOPHILS ABSOLUTE: 0.24 K/UL (ref 0.05–0.5)
EOSINOPHILS RELATIVE PERCENT: 3 % (ref 0–6)
GFR, ESTIMATED: >90 ML/MIN/1.73M2
GLUCOSE BLD-MCNC: 101 MG/DL (ref 74–99)
HCT VFR BLD CALC: 43.2 % (ref 34–48)
HDLC SERPL-MCNC: 69 MG/DL
HEMOGLOBIN: 14.2 G/DL (ref 11.5–15.5)
IMMATURE GRANULOCYTES %: 0 % (ref 0–5)
IMMATURE GRANULOCYTES ABSOLUTE: <0.03 K/UL (ref 0–0.58)
LDL CHOLESTEROL: 147 MG/DL
LYMPHOCYTES ABSOLUTE: 1.83 K/UL (ref 1.5–4)
LYMPHOCYTES RELATIVE PERCENT: 24 % (ref 20–42)
MCH RBC QN AUTO: 30.8 PG (ref 26–35)
MCHC RBC AUTO-ENTMCNC: 32.9 G/DL (ref 32–34.5)
MCV RBC AUTO: 93.7 FL (ref 80–99.9)
MONOCYTES ABSOLUTE: 0.48 K/UL (ref 0.1–0.95)
MONOCYTES RELATIVE PERCENT: 6 % (ref 2–12)
NEUTROPHILS ABSOLUTE: 4.9 K/UL (ref 1.8–7.3)
NEUTROPHILS RELATIVE PERCENT: 65 % (ref 43–80)
PDW BLD-RTO: 12.4 % (ref 11.5–15)
PLATELET # BLD: 268 K/UL (ref 130–450)
PMV BLD AUTO: 9.9 FL (ref 7–12)
POTASSIUM SERPL-SCNC: 5.4 MMOL/L (ref 3.5–5)
RBC # BLD: 4.61 M/UL (ref 3.5–5.5)
SODIUM BLD-SCNC: 139 MMOL/L (ref 132–146)
T4 FREE: 1.3 NG/DL (ref 0.9–1.7)
TOTAL PROTEIN: 7.5 G/DL (ref 6.4–8.3)
TRIGL SERPL-MCNC: 91 MG/DL
TSH SERPL DL<=0.05 MIU/L-ACNC: 2.58 UIU/ML (ref 0.27–4.2)
VITAMIN D 25-HYDROXY: 59.7 NG/ML (ref 30–100)
VLDLC SERPL CALC-MCNC: 18 MG/DL
WBC # BLD: 7.6 K/UL (ref 4.5–11.5)

## 2025-02-27 PROCEDURE — 36415 COLL VENOUS BLD VENIPUNCTURE: CPT | Performed by: FAMILY MEDICINE

## 2025-02-27 PROCEDURE — 99214 OFFICE O/P EST MOD 30 MIN: CPT | Performed by: FAMILY MEDICINE

## 2025-02-27 PROCEDURE — 1036F TOBACCO NON-USER: CPT | Performed by: FAMILY MEDICINE

## 2025-02-27 PROCEDURE — G2211 COMPLEX E/M VISIT ADD ON: HCPCS | Performed by: FAMILY MEDICINE

## 2025-02-27 PROCEDURE — 3017F COLORECTAL CA SCREEN DOC REV: CPT | Performed by: FAMILY MEDICINE

## 2025-02-27 PROCEDURE — G8427 DOCREV CUR MEDS BY ELIG CLIN: HCPCS | Performed by: FAMILY MEDICINE

## 2025-02-27 PROCEDURE — G8420 CALC BMI NORM PARAMETERS: HCPCS | Performed by: FAMILY MEDICINE

## 2025-02-27 RX ORDER — ALENDRONATE SODIUM 70 MG/1
70 TABLET ORAL
COMMUNITY
Start: 2025-01-20 | End: 2026-01-20

## 2025-02-27 RX ORDER — GALCANEZUMAB 120 MG/ML
1 INJECTION, SOLUTION SUBCUTANEOUS
Qty: 1 ML | Refills: 2 | Status: SHIPPED | OUTPATIENT
Start: 2025-02-27

## 2025-02-27 ASSESSMENT — PATIENT HEALTH QUESTIONNAIRE - PHQ9
1. LITTLE INTEREST OR PLEASURE IN DOING THINGS: SEVERAL DAYS
SUM OF ALL RESPONSES TO PHQ QUESTIONS 1-9: 2
2. FEELING DOWN, DEPRESSED OR HOPELESS: SEVERAL DAYS

## 2025-02-27 NOTE — PROGRESS NOTES
[facility]  Family Medicine Outpatient    Patient Care Team:  Vida Del Angel MD as PCP - General (Family Medicine)  [facility]  El Slater Jr., MD as Consulting Physician (Neurology)      SUBJECTIVE:  CC: had concerns including 6 Month Follow-Up, Fatigue (She is having a general feeling of being \"off\". Just extra tired and lacking energy. She would like labs ordered ), and Health Maintenance (For depression screening, she notes that she is not really feeling \"depressed\" but she is currently in grief counseling for the loss of her parents).  HPI:  Cyndi Geller is a female 65 y.o.   History of Present Illness  The patient presents for an established visit.     She has been experiencing a general sense of malaise, which she attributes to the cessation of her daily 20 to 30-minute walks with her dog due to inclement weather. This change in routine has persisted for approximately 2.5 months. She reports no issues with her appetite.    She has been experiencing mild back discomfort, which she believes is due to overexertion from lifting heavy objects.    She does not report any sleep disturbances, except for occasional awakenings at night due to work-related stress, occurring once every 2 to 3 weeks. She has found relief from these symptoms through the use of ashwagandha.    She recently initiated Fosamax therapy and is closely monitoring for potential side effects, particularly leg pain, given her family history of adverse reactions to this medication.    Her migraines are well-managed with Emgality, and she has not required the use of Imitrex. She used to have at least 15 days a month of migraines.    Review of Systems   Constitutional:  Negative for appetite change, fatigue and fever.   Respiratory:  Negative for cough, shortness of breath and wheezing.    Cardiovascular:  Negative for chest pain and palpitations.   Gastrointestinal:  Negative for abdominal

## 2025-03-03 ENCOUNTER — TELEPHONE (OUTPATIENT)
Dept: FAMILY MEDICINE CLINIC | Age: 65
End: 2025-03-03

## 2025-03-11 DIAGNOSIS — G43.711 CHRONIC MIGRAINE WITHOUT AURA, WITH INTRACTABLE MIGRAINE, SO STATED, WITH STATUS MIGRAINOSUS: ICD-10-CM

## 2025-03-11 DIAGNOSIS — F41.9 ANXIETY: ICD-10-CM

## 2025-03-11 LAB
ANION GAP SERPL CALCULATED.3IONS-SCNC: 15 MMOL/L (ref 7–16)
BUN BLDV-MCNC: 11 MG/DL (ref 6–23)
CALCIUM SERPL-MCNC: 9.6 MG/DL (ref 8.6–10.2)
CHLORIDE BLD-SCNC: 100 MMOL/L (ref 98–107)
CO2: 22 MMOL/L (ref 22–29)
CREAT SERPL-MCNC: 0.7 MG/DL (ref 0.5–1)
GFR, ESTIMATED: >90 ML/MIN/1.73M2
GLUCOSE BLD-MCNC: 81 MG/DL (ref 74–99)
POTASSIUM SERPL-SCNC: 4.1 MMOL/L (ref 3.5–5)
SODIUM BLD-SCNC: 137 MMOL/L (ref 132–146)

## 2025-05-07 ENCOUNTER — TELEPHONE (OUTPATIENT)
Dept: FAMILY MEDICINE CLINIC | Age: 65
End: 2025-05-07

## 2025-05-07 NOTE — TELEPHONE ENCOUNTER
Patient called stating that she was treated for pink eye at [facility] but that it did not resolve with treatment. Advised patient to be seen

## 2025-05-08 ENCOUNTER — OFFICE VISIT (OUTPATIENT)
Dept: FAMILY MEDICINE CLINIC | Age: 65
End: 2025-05-08
Payer: COMMERCIAL

## 2025-05-08 VITALS
SYSTOLIC BLOOD PRESSURE: 104 MMHG | BODY MASS INDEX: 22.58 KG/M2 | RESPIRATION RATE: 18 BRPM | HEART RATE: 76 BPM | WEIGHT: 115 LBS | OXYGEN SATURATION: 99 % | TEMPERATURE: 97.8 F | DIASTOLIC BLOOD PRESSURE: 70 MMHG | HEIGHT: 60 IN

## 2025-05-08 DIAGNOSIS — B96.89 ACUTE BACTERIAL SINUSITIS: Primary | ICD-10-CM

## 2025-05-08 DIAGNOSIS — J01.90 ACUTE BACTERIAL SINUSITIS: Primary | ICD-10-CM

## 2025-05-08 DIAGNOSIS — R09.82 POST-NASAL DRIP: ICD-10-CM

## 2025-05-08 PROCEDURE — G8427 DOCREV CUR MEDS BY ELIG CLIN: HCPCS | Performed by: FAMILY MEDICINE

## 2025-05-08 PROCEDURE — G8420 CALC BMI NORM PARAMETERS: HCPCS | Performed by: FAMILY MEDICINE

## 2025-05-08 PROCEDURE — 99213 OFFICE O/P EST LOW 20 MIN: CPT | Performed by: FAMILY MEDICINE

## 2025-05-08 PROCEDURE — 1036F TOBACCO NON-USER: CPT | Performed by: FAMILY MEDICINE

## 2025-05-08 PROCEDURE — 3017F COLORECTAL CA SCREEN DOC REV: CPT | Performed by: FAMILY MEDICINE

## 2025-05-08 RX ORDER — OLOPATADINE HYDROCHLORIDE 1 MG/ML
1 SOLUTION/ DROPS OPHTHALMIC 2 TIMES DAILY
Qty: 1 EACH | Refills: 0 | Status: SHIPPED | OUTPATIENT
Start: 2025-05-08 | End: 2025-06-07

## 2025-05-08 RX ORDER — LORATADINE 10 MG/1
10 TABLET ORAL DAILY
Qty: 30 TABLET | Refills: 0 | Status: SHIPPED | OUTPATIENT
Start: 2025-05-08

## 2025-05-08 RX ORDER — POLYMYXIN B SULFATE AND TRIMETHOPRIM 1; 10000 MG/ML; [USP'U]/ML
SOLUTION OPHTHALMIC
COMMUNITY
Start: 2025-04-22 | End: 2025-05-08

## 2025-05-08 RX ORDER — DOXYCYCLINE HYCLATE 100 MG
100 TABLET ORAL 2 TIMES DAILY
Qty: 14 TABLET | Refills: 0 | Status: SHIPPED | OUTPATIENT
Start: 2025-05-08 | End: 2025-05-15

## 2025-05-08 RX ORDER — ESCITALOPRAM OXALATE 5 MG/1
5 TABLET ORAL DAILY
COMMUNITY
Start: 2025-04-18

## 2025-05-08 NOTE — PROGRESS NOTES
[facility]  Family Medicine Outpatient    Patient Care Team:  Vida Del Angel MD as PCP - General (Family Medicine)  [facility]  El Slater Jr., MD as Consulting Physician (Neurology)      SUBJECTIVE:  CC: had concerns including Eye Problem (Pt had pink eye that was treated and did not resolve. Pt has still been using the eye drops in B/L eyes since 26 after was seen at the [facility][1] [facility]. //Crusty in the mornings and itches and at times will burn.).  HPI:  Cyndi Geller is a female 65 y.o.   History of Present Illness    Patient was recently seen at the [facility]] clinic and diagnosed with a conjunctivitis. She reports she was treated with antibiotic drops. Still having some eye itching. She is also having sinus congestion.     Review of Systems   Constitutional:  Negative for appetite change, fatigue and fever.   HENT:  Positive for congestion, postnasal drip and sinus pressure. Negative for drooling.    Eyes:  Positive for itching. Negative for photophobia and pain.   Respiratory:  Negative for cough, shortness of breath and wheezing.    Cardiovascular:  Negative for chest pain and palpitations.   Gastrointestinal:  Negative for abdominal pain, constipation, diarrhea, nausea and vomiting.       Outpatient Medications Marked as Taking for the 25 encounter (Office Visit) with Vida Del Angel MD   Medication Sig Dispense Refill    escitalopram (LEXAPRO) 5 MG tablet Take 1 tablet by mouth daily      olopatadine (PATANOL) 0.1 % ophthalmic solution Place 1 drop into both eyes 2 times daily 1 each 0    loratadine (CLARITIN) 10 MG tablet Take 1 tablet by mouth daily 30 tablet 0    [] doxycycline hyclate (VIBRA-TABS) 100 MG tablet Take 1 tablet by mouth 2 times daily for 7 days 14 tablet 0    Galcanezumab-gnlm (EMGALITY) 120 MG/ML SOAJ Inject 1 mL into the skin every 30 days 1 mL 2    SUMAtriptan (IMITREX) 100 MG tablet Take 1 tablet by mouth as

## 2025-06-20 DIAGNOSIS — G43.711 CHRONIC MIGRAINE WITHOUT AURA, WITH INTRACTABLE MIGRAINE, SO STATED, WITH STATUS MIGRAINOSUS: ICD-10-CM

## 2025-06-23 RX ORDER — GALCANEZUMAB 120 MG/ML
1 INJECTION, SOLUTION SUBCUTANEOUS
Qty: 1 ML | Refills: 0 | Status: SHIPPED | OUTPATIENT
Start: 2025-06-23

## 2025-06-23 NOTE — TELEPHONE ENCOUNTER
Last seen 5/8/2025  Next appt 8/28/2025    Requested Prescriptions     Pending Prescriptions Disp Refills    EMGALITY 120 MG/ML SOAJ [Pharmacy Med Name: Emgality Subcutaneous Solution Auto-injector 120 MG/ML] 1 mL 0     Sig: INJECT 1 ML INTO THE SKIN EVERY 30 DAYS    Electronically signed by MACARIO LAUREANO MA on 6/23/25 at 7:56 AM EDT

## 2025-07-31 DIAGNOSIS — G43.711 CHRONIC MIGRAINE WITHOUT AURA, WITH INTRACTABLE MIGRAINE, SO STATED, WITH STATUS MIGRAINOSUS: ICD-10-CM

## 2025-07-31 NOTE — TELEPHONE ENCOUNTER
Last seen 5/8/2025  Next appt 7/31/2025    Requested Prescriptions     Pending Prescriptions Disp Refills    EMGALITY 120 MG/ML SOAJ [Pharmacy Med Name: Emgality Subcutaneous Solution Auto-injector 120 MG/ML] 1 mL 0     Sig: INJECT 1 ML INTO THE SKIN EVERY 30 DAYS    Electronically signed by MACARIO LAUREANO MA on 7/31/25 at 9:13 AM EDT

## 2025-07-31 NOTE — TELEPHONE ENCOUNTER
Name of Medication(s) Requested:  Requested Prescriptions      No prescriptions requested or ordered in this encounter       Medication is on current medication list Yes    Dosage and directions were verified? Yes    Pharmacy Verified?  Yes    Last Appointment:  5/8/2025    Future appts:  Future Appointments   Date Time Provider Department Center   8/28/2025  8:00 AM Vida Del Angel MD [facility] DEP        (If no appt send self scheduling link. .REFILLAPPT)  Scheduling request sent?     [] Yes  [x] No    Does patient need updated?  [] Yes  [x] No

## 2025-08-01 RX ORDER — GALCANEZUMAB 120 MG/ML
1 INJECTION, SOLUTION SUBCUTANEOUS
Qty: 1 ML | Refills: 0 | Status: SHIPPED | OUTPATIENT
Start: 2025-08-01

## 2025-08-01 RX ORDER — GALCANEZUMAB 120 MG/ML
1 INJECTION, SOLUTION SUBCUTANEOUS
Qty: 1 ML | Refills: 0 | OUTPATIENT
Start: 2025-08-01

## 2025-09-04 ENCOUNTER — TELEPHONE (OUTPATIENT)
Dept: FAMILY MEDICINE CLINIC | Age: 65
End: 2025-09-04